# Patient Record
Sex: MALE | Race: WHITE | Employment: OTHER | URBAN - METROPOLITAN AREA
[De-identification: names, ages, dates, MRNs, and addresses within clinical notes are randomized per-mention and may not be internally consistent; named-entity substitution may affect disease eponyms.]

---

## 2020-01-10 ENCOUNTER — APPOINTMENT (OUTPATIENT)
Dept: CT IMAGING | Facility: HOSPITAL | Age: 75
End: 2020-01-10
Attending: EMERGENCY MEDICINE
Payer: MEDICARE

## 2020-01-10 ENCOUNTER — ANESTHESIA EVENT (OUTPATIENT)
Dept: SURGERY | Facility: HOSPITAL | Age: 75
End: 2020-01-10
Payer: MEDICARE

## 2020-01-10 ENCOUNTER — TELEPHONE (OUTPATIENT)
Dept: SURGERY | Facility: HOSPITAL | Age: 75
End: 2020-01-10

## 2020-01-10 ENCOUNTER — ANESTHESIA (OUTPATIENT)
Dept: SURGERY | Facility: HOSPITAL | Age: 75
End: 2020-01-10
Payer: MEDICARE

## 2020-01-10 ENCOUNTER — HOSPITAL ENCOUNTER (OUTPATIENT)
Facility: HOSPITAL | Age: 75
Setting detail: OBSERVATION
Discharge: HOME | End: 2020-01-12
Attending: EMERGENCY MEDICINE | Admitting: HOSPITALIST
Payer: MEDICARE

## 2020-01-10 ENCOUNTER — APPOINTMENT (OUTPATIENT)
Dept: GENERAL RADIOLOGY | Facility: HOSPITAL | Age: 75
End: 2020-01-10
Attending: UROLOGY
Payer: MEDICARE

## 2020-01-10 DIAGNOSIS — R33.9 URINARY RETENTION: ICD-10-CM

## 2020-01-10 DIAGNOSIS — N20.1 URETERAL CALCULI: ICD-10-CM

## 2020-01-10 DIAGNOSIS — E87.2 METABOLIC ACIDOSIS: ICD-10-CM

## 2020-01-10 DIAGNOSIS — N17.9 ACUTE RENAL FAILURE, UNSPECIFIED ACUTE RENAL FAILURE TYPE (HCC): Primary | ICD-10-CM

## 2020-01-10 DIAGNOSIS — N20.0 KIDNEY STONE: ICD-10-CM

## 2020-01-10 PROBLEM — E87.20 METABOLIC ACIDOSIS: Status: ACTIVE | Noted: 2020-01-10

## 2020-01-10 LAB
ALBUMIN SERPL-MCNC: 3.2 G/DL (ref 3.4–5)
ALBUMIN/GLOB SERPL: 0.7 {RATIO} (ref 1–2)
ALP LIVER SERPL-CCNC: 98 U/L (ref 45–117)
ALT SERPL-CCNC: 37 U/L (ref 16–61)
ANION GAP SERPL CALC-SCNC: 8 MMOL/L (ref 0–18)
ANION GAP SERPL CALC-SCNC: 8 MMOL/L (ref 0–18)
AST SERPL-CCNC: 19 U/L (ref 15–37)
BASOPHILS # BLD AUTO: 0.02 X10(3) UL (ref 0–0.2)
BASOPHILS NFR BLD AUTO: 0.2 %
BILIRUB SERPL-MCNC: 0.8 MG/DL (ref 0.1–2)
BILIRUB UR QL STRIP.AUTO: NEGATIVE
BUN BLD-MCNC: 63 MG/DL (ref 7–18)
BUN BLD-MCNC: 72 MG/DL (ref 7–18)
BUN/CREAT SERPL: 11 (ref 10–20)
BUN/CREAT SERPL: 13.1 (ref 10–20)
CALCIUM BLD-MCNC: 9.3 MG/DL (ref 8.5–10.1)
CALCIUM BLD-MCNC: 9.5 MG/DL (ref 8.5–10.1)
CHLORIDE SERPL-SCNC: 113 MMOL/L (ref 98–112)
CHLORIDE SERPL-SCNC: 120 MMOL/L (ref 98–112)
CLARITY UR REFRACT.AUTO: CLEAR
CO2 SERPL-SCNC: 19 MMOL/L (ref 21–32)
CO2 SERPL-SCNC: 19 MMOL/L (ref 21–32)
COLOR UR AUTO: YELLOW
CREAT BLD-MCNC: 4.81 MG/DL (ref 0.7–1.3)
CREAT BLD-MCNC: 6.57 MG/DL (ref 0.7–1.3)
DEPRECATED RDW RBC AUTO: 41.4 FL (ref 35.1–46.3)
EOSINOPHIL # BLD AUTO: 0.11 X10(3) UL (ref 0–0.7)
EOSINOPHIL NFR BLD AUTO: 0.9 %
ERYTHROCYTE [DISTWIDTH] IN BLOOD BY AUTOMATED COUNT: 12.3 % (ref 11–15)
GLOBULIN PLAS-MCNC: 4.7 G/DL (ref 2.8–4.4)
GLUCOSE BLD-MCNC: 102 MG/DL (ref 70–99)
GLUCOSE BLD-MCNC: 118 MG/DL (ref 70–99)
GLUCOSE UR STRIP.AUTO-MCNC: NEGATIVE MG/DL
HCT VFR BLD AUTO: 42.5 % (ref 39–53)
HGB BLD-MCNC: 13.7 G/DL (ref 13–17.5)
IMM GRANULOCYTES # BLD AUTO: 0.07 X10(3) UL (ref 0–1)
IMM GRANULOCYTES NFR BLD: 0.6 %
KETONES UR STRIP.AUTO-MCNC: NEGATIVE MG/DL
LYMPHOCYTES # BLD AUTO: 1.15 X10(3) UL (ref 1–4)
LYMPHOCYTES NFR BLD AUTO: 9.3 %
M PROTEIN MFR SERPL ELPH: 7.9 G/DL (ref 6.4–8.2)
MCH RBC QN AUTO: 29.7 PG (ref 26–34)
MCHC RBC AUTO-ENTMCNC: 32.2 G/DL (ref 31–37)
MCV RBC AUTO: 92 FL (ref 80–100)
MONOCYTES # BLD AUTO: 1.06 X10(3) UL (ref 0.1–1)
MONOCYTES NFR BLD AUTO: 8.6 %
NEUTROPHILS # BLD AUTO: 9.96 X10 (3) UL (ref 1.5–7.7)
NEUTROPHILS # BLD AUTO: 9.96 X10(3) UL (ref 1.5–7.7)
NEUTROPHILS NFR BLD AUTO: 80.4 %
NITRITE UR QL STRIP.AUTO: NEGATIVE
OSMOLALITY SERPL CALC.SUM OF ELEC: 312 MOSM/KG (ref 275–295)
OSMOLALITY SERPL CALC.SUM OF ELEC: 322 MOSM/KG (ref 275–295)
PH UR STRIP.AUTO: 5 [PH] (ref 4.5–8)
PLATELET # BLD AUTO: 333 10(3)UL (ref 150–450)
POTASSIUM SERPL-SCNC: 5 MMOL/L (ref 3.5–5.1)
POTASSIUM SERPL-SCNC: 5.4 MMOL/L (ref 3.5–5.1)
PROT UR STRIP.AUTO-MCNC: NEGATIVE MG/DL
RBC # BLD AUTO: 4.62 X10(6)UL (ref 3.8–5.8)
SODIUM SERPL-SCNC: 140 MMOL/L (ref 136–145)
SODIUM SERPL-SCNC: 147 MMOL/L (ref 136–145)
SP GR UR STRIP.AUTO: 1.01 (ref 1–1.03)
UROBILINOGEN UR STRIP.AUTO-MCNC: <2 MG/DL
WBC # BLD AUTO: 12.4 X10(3) UL (ref 4–11)

## 2020-01-10 PROCEDURE — 74176 CT ABD & PELVIS W/O CONTRAST: CPT | Performed by: EMERGENCY MEDICINE

## 2020-01-10 PROCEDURE — 99220 INITIAL OBSERVATION CARE,LEVL III: CPT | Performed by: HOSPITALIST

## 2020-01-10 PROCEDURE — 0T778DZ DILATION OF LEFT URETER WITH INTRALUMINAL DEVICE, VIA NATURAL OR ARTIFICIAL OPENING ENDOSCOPIC: ICD-10-PCS | Performed by: UROLOGY

## 2020-01-10 PROCEDURE — 0TC78ZZ EXTIRPATION OF MATTER FROM LEFT URETER, VIA NATURAL OR ARTIFICIAL OPENING ENDOSCOPIC: ICD-10-PCS | Performed by: UROLOGY

## 2020-01-10 PROCEDURE — 99202 OFFICE O/P NEW SF 15 MIN: CPT | Performed by: INTERNAL MEDICINE

## 2020-01-10 DEVICE — INLAY OPTIMA STENT 4.7F 26CM
Type: IMPLANTABLE DEVICE | Site: URETER | Status: NON-FUNCTIONAL
Removed: 2020-02-05

## 2020-01-10 RX ORDER — HYDROCODONE BITARTRATE AND ACETAMINOPHEN 5; 325 MG/1; MG/1
1 TABLET ORAL AS NEEDED
Status: DISCONTINUED | OUTPATIENT
Start: 2020-01-10 | End: 2020-01-10 | Stop reason: HOSPADM

## 2020-01-10 RX ORDER — LOSARTAN POTASSIUM 100 MG/1
100 TABLET ORAL DAILY
Status: ON HOLD | COMMUNITY
End: 2020-01-12

## 2020-01-10 RX ORDER — METOCLOPRAMIDE HYDROCHLORIDE 5 MG/ML
5 INJECTION INTRAMUSCULAR; INTRAVENOUS EVERY 8 HOURS PRN
Status: DISCONTINUED | OUTPATIENT
Start: 2020-01-10 | End: 2020-01-12

## 2020-01-10 RX ORDER — NALOXONE HYDROCHLORIDE 0.4 MG/ML
80 INJECTION, SOLUTION INTRAMUSCULAR; INTRAVENOUS; SUBCUTANEOUS AS NEEDED
Status: DISCONTINUED | OUTPATIENT
Start: 2020-01-10 | End: 2020-01-10 | Stop reason: HOSPADM

## 2020-01-10 RX ORDER — FINASTERIDE 5 MG/1
5 TABLET, FILM COATED ORAL DAILY
Status: DISCONTINUED | OUTPATIENT
Start: 2020-01-10 | End: 2020-01-12

## 2020-01-10 RX ORDER — DEXAMETHASONE SODIUM PHOSPHATE 4 MG/ML
VIAL (ML) INJECTION AS NEEDED
Status: DISCONTINUED | OUTPATIENT
Start: 2020-01-10 | End: 2020-01-10 | Stop reason: SURG

## 2020-01-10 RX ORDER — CIPROFLOXACIN 250 MG/1
250 TABLET, FILM COATED ORAL 2 TIMES DAILY
Status: ON HOLD | COMMUNITY
Start: 2020-01-05 | End: 2020-01-12

## 2020-01-10 RX ORDER — ROSUVASTATIN CALCIUM 20 MG/1
20 TABLET, COATED ORAL NIGHTLY
Status: ON HOLD | COMMUNITY
End: 2020-01-10

## 2020-01-10 RX ORDER — SODIUM CHLORIDE, SODIUM LACTATE, POTASSIUM CHLORIDE, CALCIUM CHLORIDE 600; 310; 30; 20 MG/100ML; MG/100ML; MG/100ML; MG/100ML
INJECTION, SOLUTION INTRAVENOUS CONTINUOUS
Status: DISCONTINUED | OUTPATIENT
Start: 2020-01-10 | End: 2020-01-10 | Stop reason: HOSPADM

## 2020-01-10 RX ORDER — LABETALOL HYDROCHLORIDE 5 MG/ML
10 INJECTION, SOLUTION INTRAVENOUS EVERY 4 HOURS PRN
Status: DISCONTINUED | OUTPATIENT
Start: 2020-01-10 | End: 2020-01-10

## 2020-01-10 RX ORDER — AMLODIPINE BESYLATE 5 MG/1
5 TABLET ORAL DAILY
Status: DISCONTINUED | OUTPATIENT
Start: 2020-01-10 | End: 2020-01-11

## 2020-01-10 RX ORDER — MEPERIDINE HYDROCHLORIDE 25 MG/ML
12.5 INJECTION INTRAMUSCULAR; INTRAVENOUS; SUBCUTANEOUS AS NEEDED
Status: DISCONTINUED | OUTPATIENT
Start: 2020-01-10 | End: 2020-01-10 | Stop reason: HOSPADM

## 2020-01-10 RX ORDER — METOPROLOL SUCCINATE 50 MG/1
50 TABLET, EXTENDED RELEASE ORAL DAILY
Status: ON HOLD | COMMUNITY
End: 2020-01-12

## 2020-01-10 RX ORDER — ONDANSETRON 2 MG/ML
4 INJECTION INTRAMUSCULAR; INTRAVENOUS AS NEEDED
Status: DISCONTINUED | OUTPATIENT
Start: 2020-01-10 | End: 2020-01-10 | Stop reason: HOSPADM

## 2020-01-10 RX ORDER — METOPROLOL SUCCINATE 50 MG/1
50 TABLET, EXTENDED RELEASE ORAL
Status: DISCONTINUED | OUTPATIENT
Start: 2020-01-10 | End: 2020-01-12

## 2020-01-10 RX ORDER — DIPHENHYDRAMINE HYDROCHLORIDE 50 MG/ML
12.5 INJECTION INTRAMUSCULAR; INTRAVENOUS AS NEEDED
Status: DISCONTINUED | OUTPATIENT
Start: 2020-01-10 | End: 2020-01-10 | Stop reason: HOSPADM

## 2020-01-10 RX ORDER — HYDROCHLOROTHIAZIDE 25 MG/1
25 TABLET ORAL DAILY
Status: ON HOLD | COMMUNITY
End: 2020-01-12

## 2020-01-10 RX ORDER — HYDROCODONE BITARTRATE AND ACETAMINOPHEN 5; 325 MG/1; MG/1
2 TABLET ORAL AS NEEDED
Status: DISCONTINUED | OUTPATIENT
Start: 2020-01-10 | End: 2020-01-10 | Stop reason: HOSPADM

## 2020-01-10 RX ORDER — METOCLOPRAMIDE HYDROCHLORIDE 5 MG/ML
10 INJECTION INTRAMUSCULAR; INTRAVENOUS AS NEEDED
Status: DISCONTINUED | OUTPATIENT
Start: 2020-01-10 | End: 2020-01-10 | Stop reason: HOSPADM

## 2020-01-10 RX ORDER — HYDROMORPHONE HYDROCHLORIDE 1 MG/ML
0.4 INJECTION, SOLUTION INTRAMUSCULAR; INTRAVENOUS; SUBCUTANEOUS EVERY 5 MIN PRN
Status: DISCONTINUED | OUTPATIENT
Start: 2020-01-10 | End: 2020-01-10 | Stop reason: HOSPADM

## 2020-01-10 RX ORDER — OMEGA-3-ACID ETHYL ESTERS 1 G/1
4 CAPSULE, LIQUID FILLED ORAL DAILY
Status: ON HOLD | COMMUNITY
End: 2020-01-12

## 2020-01-10 RX ORDER — ONDANSETRON 2 MG/ML
4 INJECTION INTRAMUSCULAR; INTRAVENOUS EVERY 6 HOURS PRN
Status: DISCONTINUED | OUTPATIENT
Start: 2020-01-10 | End: 2020-01-12

## 2020-01-10 RX ORDER — CEFAZOLIN SODIUM/WATER 2 G/20 ML
2 SYRINGE (ML) INTRAVENOUS
Status: COMPLETED | OUTPATIENT
Start: 2020-01-10 | End: 2020-01-10

## 2020-01-10 RX ORDER — LIDOCAINE HYDROCHLORIDE 10 MG/ML
INJECTION, SOLUTION EPIDURAL; INFILTRATION; INTRACAUDAL; PERINEURAL AS NEEDED
Status: DISCONTINUED | OUTPATIENT
Start: 2020-01-10 | End: 2020-01-10 | Stop reason: SURG

## 2020-01-10 RX ORDER — SODIUM CHLORIDE 9 MG/ML
INJECTION, SOLUTION INTRAVENOUS CONTINUOUS
Status: DISCONTINUED | OUTPATIENT
Start: 2020-01-10 | End: 2020-01-12

## 2020-01-10 RX ORDER — HYDRALAZINE HYDROCHLORIDE 20 MG/ML
10 INJECTION INTRAMUSCULAR; INTRAVENOUS EVERY 4 HOURS PRN
Status: DISCONTINUED | OUTPATIENT
Start: 2020-01-10 | End: 2020-01-12

## 2020-01-10 RX ORDER — DUTASTERIDE 0.5 MG/1
0.5 CAPSULE, LIQUID FILLED ORAL DAILY
Status: ON HOLD | COMMUNITY
End: 2020-01-12

## 2020-01-10 RX ORDER — ACETAMINOPHEN 325 MG/1
650 TABLET ORAL EVERY 6 HOURS PRN
Status: DISCONTINUED | OUTPATIENT
Start: 2020-01-10 | End: 2020-01-12

## 2020-01-10 RX ORDER — ONDANSETRON 2 MG/ML
INJECTION INTRAMUSCULAR; INTRAVENOUS AS NEEDED
Status: DISCONTINUED | OUTPATIENT
Start: 2020-01-10 | End: 2020-01-10 | Stop reason: SURG

## 2020-01-10 RX ORDER — MIDAZOLAM HYDROCHLORIDE 1 MG/ML
1 INJECTION INTRAMUSCULAR; INTRAVENOUS EVERY 5 MIN PRN
Status: DISCONTINUED | OUTPATIENT
Start: 2020-01-10 | End: 2020-01-10 | Stop reason: HOSPADM

## 2020-01-10 RX ADMIN — DEXAMETHASONE SODIUM PHOSPHATE 4 MG: 4 MG/ML VIAL (ML) INJECTION at 18:05:00

## 2020-01-10 RX ADMIN — ONDANSETRON 4 MG: 2 INJECTION INTRAMUSCULAR; INTRAVENOUS at 18:05:00

## 2020-01-10 RX ADMIN — CEFAZOLIN SODIUM/WATER 2 G: 2 G/20 ML SYRINGE (ML) INTRAVENOUS at 18:05:00

## 2020-01-10 RX ADMIN — SODIUM CHLORIDE: 9 INJECTION, SOLUTION INTRAVENOUS at 18:48:00

## 2020-01-10 RX ADMIN — LIDOCAINE HYDROCHLORIDE 50 MG: 10 INJECTION, SOLUTION EPIDURAL; INFILTRATION; INTRACAUDAL; PERINEURAL at 17:59:00

## 2020-01-10 NOTE — H&P
[facility]  History and Physical     Sandhya Childers Patient Status:  Observation    1945 MRN [id number]   [facility] 3NW-A Attending Jorge Richard MD   Hosp Day # 0 PCP No primary care provider on file.      Chief Complaint: encounter. Dutasteride 0.5 MG Oral Cap, Take 0.5 mg by mouth daily. , Disp: , Rfl:   Ciprofloxacin HCl 250 MG Oral Tab, Take 250 mg by mouth 2 (two) times daily. , Disp: , Rfl:   Omega-3-acid Ethyl Esters 1 g Oral Cap, Take 4 capsules by mouth daily. , Disp INR in the last 168 hours. No results for input(s): TROP, CK in the last 168 hours. Imaging: Imaging data reviewed in Epic. ASSESSMENT / PLAN:     1. Acute renal failure on CKD due to obstructive uropathy  1. Weston catheter placed  2.  IVF  3. F/

## 2020-01-10 NOTE — ANESTHESIA PREPROCEDURE EVALUATION
PRE-OP EVALUATION    Patient Name: Sandhya Childers    Pre-op Diagnosis: Ureteral calculi [N20.1]    Procedure(s):  CYSTOSCOPY, LEFT RETROGRADE, PYELOGRAM, LEFT URETEROSCOPY, LASER LITHOTRIPSY, INSERTION LEFT URETERAL STENT    Surgeon(s) and Role:     * Mason tobacco: Never Used    Alcohol use: Yes      Comment: occ      Drug use: No     Available pre-op labs reviewed.   Lab Results   Component Value Date    WBC 12.4 (H) 01/10/2020    RBC 4.62 01/10/2020    HGB 13.7 01/10/2020    HCT 42.5 01/10/2020    MCV 92.0

## 2020-01-10 NOTE — PLAN OF CARE
Patient admitted via chart. Oriented to room. Safety precautions initiated. Bed in low position. Call light in reach.

## 2020-01-10 NOTE — ED NOTES
Attempted to cath patient. Unable to pass #16 Fr Weston. Passed, with difficulty, with a 16 Fr Coude catheter. Small amount of blood initially then clear yellow urine returned. Clamped after approximately 900 ml of urine returned. Dr. Darylene Hampshire notified.   Cooper Madrid

## 2020-01-10 NOTE — CONSULTS
[facility]  Report of Consultation    Maria Elenanakia Monroe Patient Status:  Observation    1945 MRN [id number]   [facility] 3NW-A Attending Grace Hernandez MD   Hosp Day # 0 PCP No primary care provider on file.        Assessment / Plan: 24 hr tab 50 mg, 50 mg, Oral, Daily  •  0.9% NaCl infusion, , Intravenous, Continuous  •  acetaminophen (TYLENOL) tab 650 mg, 650 mg, Oral, Q6H PRN  •  ondansetron HCl (ZOFRAN) injection 4 mg, 4 mg, Intravenous, Q6H PRN  •  Metoclopramide HCl (REGLAN) inje 01/10/2020     01/10/2020    CO2 19.0 01/10/2020     01/10/2020    CA 9.5 01/10/2020    ALB 3.2 01/10/2020    ALKPHO 98 01/10/2020    BILT 0.8 01/10/2020    TP 7.9 01/10/2020    AST 19 01/10/2020    ALT 37 01/10/2020       Imaging:   All imagin

## 2020-01-10 NOTE — PROGRESS NOTES
[facility] Pharmacy Note:  Renal Dose Adjustment for Metoclopramide (REGLAN)    Mannie Saleem has been prescribed Metoclopramide (REGLAN) 10 mg every 8 hours as needed for n/v.    Estimated Creatinine Clearance: 10.7 mL/min (A) (based on SCr of 6.57 mg/dL (H)).

## 2020-01-10 NOTE — ED PROVIDER NOTES
Patient Seen in: [facility] Emergency Department      History   Patient presents with:  Eval-G    Stated Complaint: eval g    HPI    25-year-old male presents emergency department who states has some right flank pain and suprapubic pain.   Patient st crackles no wheezes no accessory muscle use  Abdomen: Suprapubic fullness noted. , no rebound no guarding  no hepatosplenomegaly bowel sounds are present , no pulsatile mass  Extremities: No clubbing cyanosis or edema 2+ distal pulses.   Neuro: Cranial nerve coudé Weston catheter was placed and patient had 1800 cc of urine. Was having some discomfort from the Weston but definitely needed as he obviously has some obstructive issues most likely prostatic hypertrophy.   Did replace some urine with some normal salin spent ordering, reviewing, and interpreting ancillary testing and imaging. The patient was frequently reevaluated, and I made frequent checks of  vital signs and monitor. Nursing notes were reviewed.      Critical care time was[45 minutes], and exclusive of

## 2020-01-10 NOTE — ED INITIAL ASSESSMENT (HPI)
Pt here today w/ c/o right flank pain - reports has had urinary symptoms over the past week but went to quick care and was told it wasn't a bacterial infection.      Patient reports symptoms started with urinary frequency and then has had urinary frequency

## 2020-01-10 NOTE — CONSULTS
[facility] Urology   Consultation Note    Cy Gutierrez Patient Status:  Emergency    1945 MRN [id number]   Location [address] Attending Solo Tatum MD   Hosp Day # 0 PCP No primary care provider on file.      Re prostate surgery    No lower extremity numbness. No saddle anesthesia. No constipation. No new medication.     Baseline urination prior to 3 weeks ago    History:  Past Medical History:   Diagnosis Date   • Essential hypertension    • Hyperlipidemia    • ALT 37 01/10/2020     Lab Results   Component Value Date/Time    PSA 2.140 07/12/2013 07:53 AM    PSA 1.99 04/10/2012 08:15 AM    PSA 2.23 03/21/2011 08:18 AM       No results found for: PERCENTPSA, PSAPER    No results found for: FREEPSA, PSAFR      Serum visible pulmonary or pleural disease. OTHER:  Negative.       =====  CONCLUSION:  Mild to moderate right and moderate to severe left hydroureteronephrosis. This is likely in part related to bladder wall thickening. This may represent cystitis.   A neop indicated and in those cases there may be need for future procedures to remove remaining stones.  Typically, the procedure causes some swelling in the ureter which can cause obstruction and pain, for that reason a ureteral stent is often left in place after

## 2020-01-10 NOTE — PLAN OF CARE
Pt admitted from ER via cart. Pt alert and oriented. Weston intact with yellow to light pink urine noted. Pt states pain is \"better\" since catheter placed and declines any pain medication at this time.   Pt instructed in NPO status for probable OR later

## 2020-01-11 LAB
ANION GAP SERPL CALC-SCNC: 7 MMOL/L (ref 0–18)
BUN BLD-MCNC: 47 MG/DL (ref 7–18)
BUN/CREAT SERPL: 14.8 (ref 10–20)
CALCIUM BLD-MCNC: 8.6 MG/DL (ref 8.5–10.1)
CHLORIDE SERPL-SCNC: 120 MMOL/L (ref 98–112)
CO2 SERPL-SCNC: 18 MMOL/L (ref 21–32)
CREAT BLD-MCNC: 3.17 MG/DL (ref 0.7–1.3)
DEPRECATED RDW RBC AUTO: 42.2 FL (ref 35.1–46.3)
ERYTHROCYTE [DISTWIDTH] IN BLOOD BY AUTOMATED COUNT: 12.4 % (ref 11–15)
GLUCOSE BLD-MCNC: 114 MG/DL (ref 70–99)
HCT VFR BLD AUTO: 40.7 % (ref 39–53)
HGB BLD-MCNC: 12.9 G/DL (ref 13–17.5)
MCH RBC QN AUTO: 29.3 PG (ref 26–34)
MCHC RBC AUTO-ENTMCNC: 31.7 G/DL (ref 31–37)
MCV RBC AUTO: 92.3 FL (ref 80–100)
OSMOLALITY SERPL CALC.SUM OF ELEC: 313 MOSM/KG (ref 275–295)
PLATELET # BLD AUTO: 315 10(3)UL (ref 150–450)
POTASSIUM SERPL-SCNC: 5 MMOL/L (ref 3.5–5.1)
RBC # BLD AUTO: 4.41 X10(6)UL (ref 3.8–5.8)
SODIUM SERPL-SCNC: 145 MMOL/L (ref 136–145)
WBC # BLD AUTO: 12.7 X10(3) UL (ref 4–11)

## 2020-01-11 PROCEDURE — 99225 SUBSEQUENT OBSERVATION CARE: CPT | Performed by: HOSPITALIST

## 2020-01-11 PROCEDURE — 99214 OFFICE O/P EST MOD 30 MIN: CPT | Performed by: INTERNAL MEDICINE

## 2020-01-11 NOTE — OPERATIVE REPORT
[address] Patient Status:  Observation    1945 MRN [id number]   Location [facility] Attending Oralia Pichardo MD   Hosp Day # 0 PCP No primary care provider on file.        D prostate was moderately enlarged with a 4 cm prostatic urethral length, 2 cm of lateral lobe coaptation and mild enlargement of the median lobe. The bladder was severely trabeculated. There were no bladder tumors. The ureters appeared to be wide open.

## 2020-01-11 NOTE — ANESTHESIA POSTPROCEDURE EVALUATION
20 Mar Chau Patient Status:  Observation   Age/Gender 75year old male MRN [id number]   Location [address] [facility] Attending Ana Laura Buckner MD   Hosp Day # 0 PCP No primary care provider on file.        Anesthesi

## 2020-01-11 NOTE — PROGRESS NOTES
[facility]    Progress Note    Won Murphy Patient Status:  Observation    1945 MRN [id number]   [facility] 3NW-A Attending Ely Saeed MD   Hosp Day # 0 PCP No primary care provider on file.         Subjective:   Perry Haney Sandra Petra 2d Rndr(no Iv,no Oral)(cpt=74176)    Result Date: 1/10/2020  CONCLUSION:  Mild to moderate right and moderate to severe left hydroureteronephrosis. This is likely in part related to bladder wall thickening. This may represent cystitis.   A ginna

## 2020-01-11 NOTE — TELEPHONE ENCOUNTER
Surgeon:LAUREN Rubi M.D  Location (if known):[facility]  Procedure: TURP  Anesthesia Gen  Time Frame: as sson as schedule allows.   Time required: 90 min  Diagnosis: BPH  Special Instructions/Equipment: Monoplar Set  Estimated Post Op/Follow Up Appt: Nurse visit fo

## 2020-01-11 NOTE — PROGRESS NOTES
[facility]  Progress Note     Lydia Dunbar Patient Status:  Observation    1945 MRN [id number]   [facility] 3NW-A Attending Luis Muller MD   Hosp Day # 0 PCP No primary care provider on file.      Chief Complaint: Urinary r hours. No results for input(s): TROP, CK in the last 168 hours. Imaging: Imaging data reviewed in Epic. Medications:   • finasteride  5 mg Oral Daily   • Metoprolol Succinate ER  50 mg Oral Daily Beta Blocker       ASSESSMENT / PLAN:     1.  A

## 2020-01-11 NOTE — PROGRESS NOTES
NURSING ADMISSION NOTE      Patient admitted via Cart from PACU  Oriented to room. Safety precautions initiated. Bed in low position. Call light in reach.

## 2020-01-11 NOTE — PLAN OF CARE
Pt alert and orientatedx4. Room air. Daily weight. SCDs. Regular diet but has only had water this shift. Weston in place, red/pink urine output. Denies pain. Denies N/V. IVF infusing. Denies chest pain and shortness of breath. POC discussed with patient.  Ca patient's stated pain goal  Description  INTERVENTIONS:  - Encourage pt to monitor pain and request assistance  - Assess pain using appropriate pain scale  - Administer analgesics based on type and severity of pain and evaluate response  - Implement non-ph

## 2020-01-11 NOTE — PROGRESS NOTES
[facility]  Nephrology Progress Note    Marisela Carreon Attending:  Kimberly Mares MD       Assessment and Plan:    1) MOIRA- subacute kidney injury due to obstructive uropathy- much improved with Weston placement; may not completely recover to baseline g  01/11/2020    CA 8.6 01/11/2020       Imaging: All imaging studies reviewed.     Meds:   finasteride (PROSCAR) tab 5 mg, 5 mg, Oral, Daily  Metoprolol Succinate ER (Toprol XL) 24 hr tab 50 mg, 50 mg, Oral, Daily Beta Blocker  0.9% NaCl infusion,

## 2020-01-11 NOTE — ANESTHESIA PROCEDURE NOTES
Airway  Date/Time: 1/10/2020 6:00 PM  Urgency: elective      General Information and Staff    Patient location during procedure: OR  Anesthesiologist: Kia Cordon MD  Performed: anesthesiologist     Indications and Patient Condition  Indication

## 2020-01-11 NOTE — PLAN OF CARE
Received care of patient at 0730. Patient is A&OX4. Room air. VSS. Denies pain. Weston draining to gravity, clear pink tinged urine. Weston care education reviewed with patient. New blood pressure medication reviewed with patient, verbalized understanding.  A

## 2020-01-12 VITALS
RESPIRATION RATE: 18 BRPM | HEIGHT: 72 IN | SYSTOLIC BLOOD PRESSURE: 122 MMHG | TEMPERATURE: 98 F | HEART RATE: 66 BPM | OXYGEN SATURATION: 99 % | BODY MASS INDEX: 27.9 KG/M2 | WEIGHT: 206 LBS | DIASTOLIC BLOOD PRESSURE: 76 MMHG

## 2020-01-12 LAB
ANION GAP SERPL CALC-SCNC: 3 MMOL/L (ref 0–18)
BUN BLD-MCNC: 41 MG/DL (ref 7–18)
BUN/CREAT SERPL: 18.2 (ref 10–20)
CALCIUM BLD-MCNC: 9.3 MG/DL (ref 8.5–10.1)
CHLORIDE SERPL-SCNC: 117 MMOL/L (ref 98–112)
CO2 SERPL-SCNC: 24 MMOL/L (ref 21–32)
CREAT BLD-MCNC: 2.25 MG/DL (ref 0.7–1.3)
GLUCOSE BLD-MCNC: 93 MG/DL (ref 70–99)
OSMOLALITY SERPL CALC.SUM OF ELEC: 308 MOSM/KG (ref 275–295)
POTASSIUM SERPL-SCNC: 5.4 MMOL/L (ref 3.5–5.1)
SODIUM SERPL-SCNC: 144 MMOL/L (ref 136–145)

## 2020-01-12 PROCEDURE — 99213 OFFICE O/P EST LOW 20 MIN: CPT | Performed by: INTERNAL MEDICINE

## 2020-01-12 PROCEDURE — 99217 OBSERVATION CARE DISCHARGE: CPT | Performed by: HOSPITALIST

## 2020-01-12 RX ORDER — METOPROLOL SUCCINATE 25 MG/1
25 TABLET, EXTENDED RELEASE ORAL
Qty: 30 TABLET | Refills: 0 | Status: SHIPPED | OUTPATIENT
Start: 2020-01-13 | End: 2020-02-11

## 2020-01-12 RX ORDER — METOPROLOL SUCCINATE 25 MG/1
25 TABLET, EXTENDED RELEASE ORAL
Status: DISCONTINUED | OUTPATIENT
Start: 2020-01-13 | End: 2020-01-12

## 2020-01-12 NOTE — PROGRESS NOTES
[facility]  Urology Progress Note    Nickola Flight Patient Status:  Observation    1945 MRN [id number]   [facility] 3NW-A Attending Shahriar Das MD   Hosp Day # 0 PCP No primary care provider on file.      Assessment/Plan:    TU

## 2020-01-12 NOTE — PROGRESS NOTES
1/12/2020 PT AND SPOUSE TAUGHT KENT CATHETER CARE AND LEG BAG TEACHING INCLUDING HOW TO EMPTY AND CHANGE BETWEEN THE TWO BAGS.  SIGNS AND SYMPTOMS OF INFECTION OF WHICH TO NOTIFY DOCTOR REVIEWED WITH PT. PT ENCOURAGED TO KEEP URINE DRAINAGE BAG BELOW LEVEL

## 2020-01-12 NOTE — PROGRESS NOTES
[facility]  Progress Note     Sandhya Font Patient Status:  Observation    1945 MRN [id number]   [facility] [place] 3NW-A Attending Jorge Richard MD   Hosp Day # 0 PCP No primary care provider on file.      Chief Complaint: Urinary r INR in the last 168 hours. No results for input(s): TROP, CK in the last 168 hours. Imaging: Imaging data reviewed in Epic.     Medications:   • [START ON 1/13/2020] Metoprolol Succinate ER  25 mg Oral Daily Beta Blocker   • psyllium  1 packet Or

## 2020-01-12 NOTE — PROGRESS NOTES
[facility]  Nephrology Progress Note    Camella Minus Attending:  Trang Valentine MD       Assessment and Plan:    1) MOIRA- subacute kidney injury due to obstructive uropathy- much improved with Weston placement / stent- near baseline.  Taking PO well, r Daily  Metoprolol Succinate ER (Toprol XL) 24 hr tab 50 mg, 50 mg, Oral, Daily Beta Blocker  0.9% NaCl infusion, , Intravenous, Continuous  acetaminophen (TYLENOL) tab 650 mg, 650 mg, Oral, Q6H PRN  ondansetron HCl (ZOFRAN) injection 4 mg, 4 mg, Intravenou

## 2020-01-12 NOTE — PLAN OF CARE
Problem: GENITOURINARY - ADULT  Goal: Absence of urinary retention  Description  INTERVENTIONS:  - Assess patient’s ability to void and empty bladder  - Monitor intake/output and perform bladder scan as needed  - Follow urinary retention protocol/standar response  - Consider cultural and social influences on pain and pain management  - Manage/alleviate anxiety  - Utilize distraction and/or relaxation techniques  - Monitor for opioid side effects  - Notify MD/LIP if interventions unsuccessful or patient rep

## 2020-01-12 NOTE — PLAN OF CARE
Pt resting comfortably in bed upon assessment, states he \"feels fine\" tonight. Weston draining clear light pink urine, adequate amounts. Pt denies any abdominal pain or discomfort. Pt tolerating diet, states he is passing flatus, no nausea.  SCDs on while devices as appropriate  - Consider OT/PT consult to assist with strengthening/mobility  - Encourage toileting schedule  Outcome: Progressing     Problem: PAIN - ADULT  Goal: Verbalizes/displays adequate comfort level or patient's stated pain goal  Descript

## 2020-01-13 LAB
CALCULI MASS: 84 MG
CALCULI NUMBER: 1

## 2020-01-13 NOTE — DISCHARGE SUMMARY
[facility]  DISCHARGE SUMMARY     Ratna Quiles Patient Status:  Observation    1945 MRN [id number]   [facility] 3NW-A Attending No att. providers found   Hosp Day # 0 PCP No primary care provider on file.      Date of Admission urology as an outpt for TURP and stent removal.       Lace+ Score: 38  59-90 High Risk  29-58 Medium Risk  0-28   Low Risk         TCM Follow-Up Recommendation:  LACE 29-58:  Moderate Risk of readmission after discharge from the hospital.    Procedures yakovi Length Department Provider     1/15/2020 11:45 AM Arrive by 11:30 AM NEW PT ACUTE VISIT [5244] 30 min.  [facility], [address] [facility], Flora Hollis MD    Patient Instructions:          Location Instructions:      [address] [facility]

## 2020-01-14 ENCOUNTER — APPOINTMENT (OUTPATIENT)
Dept: ULTRASOUND IMAGING | Facility: HOSPITAL | Age: 75
DRG: 554 | End: 2020-01-14
Attending: EMERGENCY MEDICINE
Payer: MEDICARE

## 2020-01-14 ENCOUNTER — HOSPITAL ENCOUNTER (INPATIENT)
Facility: HOSPITAL | Age: 75
LOS: 2 days | Discharge: HOME | DRG: 554 | End: 2020-01-16
Attending: EMERGENCY MEDICINE | Admitting: INTERNAL MEDICINE
Payer: MEDICARE

## 2020-01-14 DIAGNOSIS — L03.116 CELLULITIS OF LEFT KNEE: Primary | ICD-10-CM

## 2020-01-14 DIAGNOSIS — D72.829 LEUKOCYTOSIS, UNSPECIFIED TYPE: ICD-10-CM

## 2020-01-14 DIAGNOSIS — N39.0 URINARY TRACT INFECTION WITHOUT HEMATURIA, SITE UNSPECIFIED: ICD-10-CM

## 2020-01-14 PROBLEM — R73.9 HYPERGLYCEMIA: Status: ACTIVE | Noted: 2020-01-14

## 2020-01-14 LAB
ALBUMIN SERPL-MCNC: 2.9 G/DL (ref 3.4–5)
ALBUMIN/GLOB SERPL: 0.6 {RATIO} (ref 1–2)
ALP LIVER SERPL-CCNC: 92 U/L (ref 45–117)
ALT SERPL-CCNC: 33 U/L (ref 16–61)
ANION GAP SERPL CALC-SCNC: 8 MMOL/L (ref 0–18)
AST SERPL-CCNC: 21 U/L (ref 15–37)
BASOPHIL SYNOVIAL FLUID: 0 %
BASOPHILS # BLD AUTO: 0.03 X10(3) UL (ref 0–0.2)
BASOPHILS NFR BLD AUTO: 0.2 %
BILIRUB SERPL-MCNC: 1.5 MG/DL (ref 0.1–2)
BILIRUB UR QL STRIP.AUTO: NEGATIVE
BUN BLD-MCNC: 36 MG/DL (ref 7–18)
BUN/CREAT SERPL: 19.1 (ref 10–20)
CALCIUM BLD-MCNC: 9.4 MG/DL (ref 8.5–10.1)
CHLORIDE SERPL-SCNC: 109 MMOL/L (ref 98–112)
CO2 SERPL-SCNC: 20 MMOL/L (ref 21–32)
COLOR UR AUTO: YELLOW
CREAT BLD-MCNC: 1.88 MG/DL (ref 0.7–1.3)
CRYSTALS: POSITIVE
DEPRECATED RDW RBC AUTO: 40.5 FL (ref 35.1–46.3)
EOSINOPHIL # BLD AUTO: 0.21 X10(3) UL (ref 0–0.7)
EOSINOPHIL NFR BLD AUTO: 1.3 %
EOSINOPHIL SYNOVIAL FLUID: 0 %
ERYTHROCYTE [DISTWIDTH] IN BLOOD BY AUTOMATED COUNT: 12.3 % (ref 11–15)
GLOBULIN PLAS-MCNC: 5.1 G/DL (ref 2.8–4.4)
GLUCOSE BLD-MCNC: 120 MG/DL (ref 70–99)
GLUCOSE UR STRIP.AUTO-MCNC: NEGATIVE MG/DL
HCT VFR BLD AUTO: 42.3 % (ref 39–53)
HGB BLD-MCNC: 13.8 G/DL (ref 13–17.5)
IMM GRANULOCYTES # BLD AUTO: 0.17 X10(3) UL (ref 0–1)
IMM GRANULOCYTES NFR BLD: 1 %
KETONES UR STRIP.AUTO-MCNC: NEGATIVE MG/DL
LYMPH SYNOVIAL FLUID: 0 %
LYMPHOCYTES # BLD AUTO: 1.45 X10(3) UL (ref 1–4)
LYMPHOCYTES NFR BLD AUTO: 8.9 %
M PROTEIN MFR SERPL ELPH: 8 G/DL (ref 6.4–8.2)
MCH RBC QN AUTO: 29.7 PG (ref 26–34)
MCHC RBC AUTO-ENTMCNC: 32.6 G/DL (ref 31–37)
MCV RBC AUTO: 91 FL (ref 80–100)
MON/MAC/HIST SYNOVIAL FLUID: 12 %
MONOCYTES # BLD AUTO: 1.37 X10(3) UL (ref 0.1–1)
MONOCYTES NFR BLD AUTO: 8.4 %
NEUTROPHIL SYNOVIAL FLUID: 88 % (ref ?–20)
NEUTROPHILS # BLD AUTO: 13.06 X10 (3) UL (ref 1.5–7.7)
NEUTROPHILS # BLD AUTO: 13.06 X10(3) UL (ref 1.5–7.7)
NEUTROPHILS NFR BLD AUTO: 80.2 %
NITRITE UR QL STRIP.AUTO: NEGATIVE
OSMOLALITY SERPL CALC.SUM OF ELEC: 294 MOSM/KG (ref 275–295)
PH UR STRIP.AUTO: 5 [PH] (ref 4.5–8)
PLATELET # BLD AUTO: 329 10(3)UL (ref 150–450)
POTASSIUM SERPL-SCNC: 4.3 MMOL/L (ref 3.5–5.1)
PROT UR STRIP.AUTO-MCNC: 30 MG/DL
RBC # BLD AUTO: 4.65 X10(6)UL (ref 3.8–5.8)
RBC #/AREA URNS AUTO: >10 /HPF
SED RATE-ML: 45 MM/HR (ref 0–12)
SODIUM SERPL-SCNC: 137 MMOL/L (ref 136–145)
SP GR UR STRIP.AUTO: 1.02 (ref 1–1.03)
TOTAL CELLS COUNTED: 100
UROBILINOGEN UR STRIP.AUTO-MCNC: <2 MG/DL
WBC # BLD AUTO: 16.3 X10(3) UL (ref 4–11)

## 2020-01-14 PROCEDURE — 81001 URINALYSIS AUTO W/SCOPE: CPT | Performed by: EMERGENCY MEDICINE

## 2020-01-14 PROCEDURE — 89050 BODY FLUID CELL COUNT: CPT | Performed by: EMERGENCY MEDICINE

## 2020-01-14 PROCEDURE — 99285 EMERGENCY DEPT VISIT HI MDM: CPT

## 2020-01-14 PROCEDURE — 87086 URINE CULTURE/COLONY COUNT: CPT | Performed by: EMERGENCY MEDICINE

## 2020-01-14 PROCEDURE — 89051 BODY FLUID CELL COUNT: CPT | Performed by: EMERGENCY MEDICINE

## 2020-01-14 PROCEDURE — 96361 HYDRATE IV INFUSION ADD-ON: CPT

## 2020-01-14 PROCEDURE — 85025 COMPLETE CBC W/AUTO DIFF WBC: CPT | Performed by: EMERGENCY MEDICINE

## 2020-01-14 PROCEDURE — 93971 EXTREMITY STUDY: CPT | Performed by: EMERGENCY MEDICINE

## 2020-01-14 PROCEDURE — 96374 THER/PROPH/DIAG INJ IV PUSH: CPT

## 2020-01-14 PROCEDURE — 87070 CULTURE OTHR SPECIMN AEROBIC: CPT | Performed by: EMERGENCY MEDICINE

## 2020-01-14 PROCEDURE — 36415 COLL VENOUS BLD VENIPUNCTURE: CPT

## 2020-01-14 PROCEDURE — 80053 COMPREHEN METABOLIC PANEL: CPT | Performed by: EMERGENCY MEDICINE

## 2020-01-14 PROCEDURE — 85652 RBC SED RATE AUTOMATED: CPT | Performed by: EMERGENCY MEDICINE

## 2020-01-14 PROCEDURE — 20610 DRAIN/INJ JOINT/BURSA W/O US: CPT

## 2020-01-14 PROCEDURE — 89060 EXAM SYNOVIAL FLUID CRYSTALS: CPT | Performed by: EMERGENCY MEDICINE

## 2020-01-14 PROCEDURE — 87040 BLOOD CULTURE FOR BACTERIA: CPT | Performed by: EMERGENCY MEDICINE

## 2020-01-14 PROCEDURE — 87205 SMEAR GRAM STAIN: CPT | Performed by: EMERGENCY MEDICINE

## 2020-01-14 PROCEDURE — 0S9D3ZZ DRAINAGE OF LEFT KNEE JOINT, PERCUTANEOUS APPROACH: ICD-10-PCS | Performed by: EMERGENCY MEDICINE

## 2020-01-14 RX ORDER — MORPHINE SULFATE 4 MG/ML
4 INJECTION, SOLUTION INTRAMUSCULAR; INTRAVENOUS EVERY 30 MIN PRN
Status: ACTIVE | OUTPATIENT
Start: 2020-01-14 | End: 2020-01-14

## 2020-01-14 RX ORDER — METOPROLOL SUCCINATE 25 MG/1
25 TABLET, EXTENDED RELEASE ORAL
Status: DISCONTINUED | OUTPATIENT
Start: 2020-01-15 | End: 2020-01-16

## 2020-01-14 RX ORDER — ONDANSETRON 2 MG/ML
4 INJECTION INTRAMUSCULAR; INTRAVENOUS EVERY 4 HOURS PRN
Status: DISCONTINUED | OUTPATIENT
Start: 2020-01-14 | End: 2020-01-14

## 2020-01-14 RX ORDER — METOCLOPRAMIDE HYDROCHLORIDE 5 MG/ML
5 INJECTION INTRAMUSCULAR; INTRAVENOUS EVERY 8 HOURS PRN
Status: DISCONTINUED | OUTPATIENT
Start: 2020-01-14 | End: 2020-01-16

## 2020-01-14 RX ORDER — POLYETHYLENE GLYCOL 3350 17 G/17G
17 POWDER, FOR SOLUTION ORAL DAILY PRN
Status: DISCONTINUED | OUTPATIENT
Start: 2020-01-14 | End: 2020-01-16

## 2020-01-14 RX ORDER — SODIUM PHOSPHATE, DIBASIC AND SODIUM PHOSPHATE, MONOBASIC 7; 19 G/133ML; G/133ML
1 ENEMA RECTAL ONCE AS NEEDED
Status: DISCONTINUED | OUTPATIENT
Start: 2020-01-14 | End: 2020-01-16

## 2020-01-14 RX ORDER — CEFAZOLIN SODIUM/WATER 2 G/20 ML
2 SYRINGE (ML) INTRAVENOUS ONCE
Status: COMPLETED | OUTPATIENT
Start: 2020-01-14 | End: 2020-01-14

## 2020-01-14 RX ORDER — SODIUM CHLORIDE 9 MG/ML
INJECTION, SOLUTION INTRAVENOUS CONTINUOUS
Status: ACTIVE | OUTPATIENT
Start: 2020-01-14 | End: 2020-01-14

## 2020-01-14 RX ORDER — ACETAMINOPHEN 325 MG/1
650 TABLET ORAL EVERY 6 HOURS PRN
Status: DISCONTINUED | OUTPATIENT
Start: 2020-01-14 | End: 2020-01-16

## 2020-01-14 RX ORDER — ONDANSETRON 2 MG/ML
4 INJECTION INTRAMUSCULAR; INTRAVENOUS EVERY 6 HOURS PRN
Status: DISCONTINUED | OUTPATIENT
Start: 2020-01-14 | End: 2020-01-16

## 2020-01-14 RX ORDER — BISACODYL 10 MG
10 SUPPOSITORY, RECTAL RECTAL
Status: DISCONTINUED | OUTPATIENT
Start: 2020-01-14 | End: 2020-01-16

## 2020-01-14 RX ORDER — ENOXAPARIN SODIUM 100 MG/ML
40 INJECTION SUBCUTANEOUS NIGHTLY
Status: DISCONTINUED | OUTPATIENT
Start: 2020-01-14 | End: 2020-01-16

## 2020-01-14 NOTE — ED PROVIDER NOTES
Patient Seen in: [facility] Emergency Department      History   Patient presents with:  Abnormal Result    Stated Complaint: elev WBC    HPI    This is a 16-year-old male who presents with swelling to the left knee, left foot.   He says is primarily Smoker      Smokeless tobacco: Never Used    Alcohol use: Yes      Comment: occ    Drug use: No             Review of Systems    Positive for stated complaint: elev WBC  Other systems are as noted in HPI. Constitutional and vital signs reviewed.       All CO2 20.0 (*)     BUN 36 (*)     Creatinine 1.88 (*)     GFR, Non- 34 (*)     GFR, -American 40 (*)     Albumin 2.9 (*)     Globulin  5.1 (*)     A/G Ratio 0.6 (*)     All other components within normal limits   SED RATE, WESTERGREN ( In process                   Please view results for these tests on the individual orders.    CELL COUNT AND CRYSTALS SYNOVIAL   RAINBOW DRAW BLUE   RAINBOW DRAW LAVENDER   RAINBOW DRAW LIGHT GREEN   RAINBOW DRAW GOLD   BLOOD CULTURE   BLOOD CUL and augmentation. OTHER:  There is a left popliteal fossa Baker's cyst measuring 2.7 x 1.9 x 0.9 cm. CONCLUSION:  1. No evidence of left lower extremity DVT. 2. 2.7 cm Baker's cysts within the left popliteal fossa. Dictated by:  Chung Núñez DO on small very small amount of fluid. It was sent for analysis. It did show that there was 88 neutrophils. I could not get a significant amounts of this this this fluid was sent for culture. Blood cultures were obtained. Patient was given 2 g of Ancef.   [place] [employer]

## 2020-01-14 NOTE — ED INITIAL ASSESSMENT (HPI)
Swelling to right knee and foot today. Couldn't walk. Went to immediate care. Pt had xrays, bloodwork, sent to ED for eval for Elevated WBC.  Just discharged from hospital on Sunday for kidney stone removal.

## 2020-01-15 LAB
ANION GAP SERPL CALC-SCNC: 8 MMOL/L (ref 0–18)
BASOPHILS # BLD AUTO: 0.03 X10(3) UL (ref 0–0.2)
BASOPHILS NFR BLD AUTO: 0.2 %
BUN BLD-MCNC: 31 MG/DL (ref 7–18)
BUN/CREAT SERPL: 18.2 (ref 10–20)
CALCIUM BLD-MCNC: 8.8 MG/DL (ref 8.5–10.1)
CHLORIDE SERPL-SCNC: 109 MMOL/L (ref 98–112)
CO2 SERPL-SCNC: 23 MMOL/L (ref 21–32)
CREAT BLD-MCNC: 1.7 MG/DL (ref 0.7–1.3)
DEPRECATED RDW RBC AUTO: 41.7 FL (ref 35.1–46.3)
EOSINOPHIL # BLD AUTO: 0.28 X10(3) UL (ref 0–0.7)
EOSINOPHIL NFR BLD AUTO: 2.2 %
ERYTHROCYTE [DISTWIDTH] IN BLOOD BY AUTOMATED COUNT: 12.2 % (ref 11–15)
GLUCOSE BLD-MCNC: 102 MG/DL (ref 70–99)
HCT VFR BLD AUTO: 38.1 % (ref 39–53)
HGB BLD-MCNC: 12.3 G/DL (ref 13–17.5)
IMM GRANULOCYTES # BLD AUTO: 0.13 X10(3) UL (ref 0–1)
IMM GRANULOCYTES NFR BLD: 1 %
LYMPHOCYTES # BLD AUTO: 1.28 X10(3) UL (ref 1–4)
LYMPHOCYTES NFR BLD AUTO: 10.1 %
MCH RBC QN AUTO: 30.1 PG (ref 26–34)
MCHC RBC AUTO-ENTMCNC: 32.3 G/DL (ref 31–37)
MCV RBC AUTO: 93.2 FL (ref 80–100)
MONOCYTES # BLD AUTO: 1.1 X10(3) UL (ref 0.1–1)
MONOCYTES NFR BLD AUTO: 8.7 %
NEUTROPHILS # BLD AUTO: 9.81 X10 (3) UL (ref 1.5–7.7)
NEUTROPHILS # BLD AUTO: 9.81 X10(3) UL (ref 1.5–7.7)
NEUTROPHILS NFR BLD AUTO: 77.8 %
OSMOLALITY SERPL CALC.SUM OF ELEC: 297 MOSM/KG (ref 275–295)
PLATELET # BLD AUTO: 252 10(3)UL (ref 150–450)
POTASSIUM SERPL-SCNC: 3.9 MMOL/L (ref 3.5–5.1)
RBC # BLD AUTO: 4.09 X10(6)UL (ref 3.8–5.8)
SODIUM SERPL-SCNC: 140 MMOL/L (ref 136–145)
URATE SERPL-MCNC: 7 MG/DL (ref 3.5–7.2)
WBC # BLD AUTO: 12.6 X10(3) UL (ref 4–11)

## 2020-01-15 PROCEDURE — 85025 COMPLETE CBC W/AUTO DIFF WBC: CPT | Performed by: INTERNAL MEDICINE

## 2020-01-15 PROCEDURE — 97116 GAIT TRAINING THERAPY: CPT

## 2020-01-15 PROCEDURE — 84550 ASSAY OF BLOOD/URIC ACID: CPT | Performed by: INTERNAL MEDICINE

## 2020-01-15 PROCEDURE — 80048 BASIC METABOLIC PNL TOTAL CA: CPT | Performed by: INTERNAL MEDICINE

## 2020-01-15 PROCEDURE — 97161 PT EVAL LOW COMPLEX 20 MIN: CPT

## 2020-01-15 RX ORDER — COLCHICINE 0.6 MG/1
0.6 TABLET ORAL DAILY
Status: DISCONTINUED | OUTPATIENT
Start: 2020-01-16 | End: 2020-01-16

## 2020-01-15 RX ORDER — COLCHICINE 0.6 MG/1
1.2 TABLET ORAL ONCE
Status: COMPLETED | OUTPATIENT
Start: 2020-01-15 | End: 2020-01-15

## 2020-01-15 RX ORDER — PREDNISONE 20 MG/1
40 TABLET ORAL
Status: DISCONTINUED | OUTPATIENT
Start: 2020-01-15 | End: 2020-01-16

## 2020-01-15 NOTE — PHYSICAL THERAPY NOTE
PHYSICAL THERAPY EVALUATION - INPATIENT     Room Number: 715/190-B  Evaluation Date: 1/15/2020  Type of Evaluation: Initial  Physician Order: PT Eval and Treat    Presenting Problem: L LE pain  Reason for Therapy: Mobility Dysfunction and Discharge P ASSESSMENT  Upper extremity ROM and strength are within functional limits     Lower extremity ROM is within functional limits     Lower extremity strength is within functional limits except for the following:    Left Hip flexion  3+/5  Left Knee extension stance time on L LE. Pt returned to room supine in bed with supervision. Pt educated on HEP and activity recommendations. Pt left with call light in reach. RN aware.      Exercise/Education Provided:  Functional activity tolerated  Gait training  Posture  S Established Goals: 3      CURRENT GOALS    Goal #1 Patient is able to demonstrate supine - sit EOB @ level: modified independent- MET     Goal #2 Patient is able to demonstrate transfers EOB to/from [facility] at assistance level: modified independent     Goal #3

## 2020-01-15 NOTE — PLAN OF CARE
Problem: SKIN/TISSUE INTEGRITY - ADULT  Goal: Skin integrity remains intact  Description  INTERVENTIONS  - Assess and document risk factors for pressure ulcer development  - Assess and document skin integrity  - Monitor for areas of redness and/or skin b as appropriate  Outcome: Progressing     Problem: SAFETY ADULT - FALL  Goal: Free from fall injury  Description  INTERVENTIONS:  - Assess pt frequently for physical needs  - Identify cognitive and physical deficits and behaviors that affect risk of falls. management    Interventions:   - PRN pain medication  -Cluster care  -Dim lighting  -Hot/cold packs  - See additional Care Plan goals for specific interventions   Outcome: Progressing    See flowsheets. Axo x 4. Reading glasses at home. RA. IS. No tele.  Ch

## 2020-01-15 NOTE — H&P
[facility] Hospital[facility] History and Physical      Patient presents with:  Abnormal Result       PCP: Alisha Eddy MD      History of Present Illness: Patient is a 75year old male with PMH sig for morbid obesity, HTN, nephrolithiasis presents for eval of swelling normal. Teeth and gums normal.   Neck: Supple, symmetrical, trachea midline, no cervical or supraclavicular lymph adenopathy, thyroid: no enlargment/tenderness/nodules appreciated   Lungs:   Clear to auscultation bilaterally.  Normal effort   Chest wall:  N 1/14/2020  PROCEDURE:  US VENOUS DOPPLER LEG LEFT - DIAG IMG (CPT=93971)  COMPARISON:  None. INDICATIONS:  Left lower extremity pain. TECHNIQUE:  Real time, grey scale, and duplex ultrasound was used to evaluate the lower extremity venous system.  B-mode limited without intravenous or oral contrast.  KIDNEYS:  Mild right hydroureteronephrosis and moderate to severe left hydroureteronephrosis present. On the left this is likely in part related to a stone of the distal left ureter measuring 7 x 5 mm.   The b 11:09          Xr Or - N/c    Result Date: 1/10/2020  PROCEDURE:  XR OR - N/C  COMPARISON:  None. INDICATIONS:  Bilateral retrograde with left-sided stent placement.   TECHNIQUE:   FLUOROSCOPY IMAGES OBTAINED:  5 FLUOROSCOPY TIME:  57 seconds TECHNOLOGIST

## 2020-01-15 NOTE — DIETARY NOTE
[address] [place] [address]     Admitting diagnosis:  Cellulitis of left knee [Y67.386]  Urinary tract infection without hematuria, site unspecified [N39.0]  Leukocytosis, unspecified type [D72.829]    Ht: 182.9 cm (6' 0.01\")  Wt

## 2020-01-15 NOTE — PLAN OF CARE
Pt is aox4, VSS, afebrile. RA. Tele NSR. Lovenox, electrolyte protocol. Up with walker. C/o pain in knee moving down to ankle with movement. Indwelling isabel catheter in place from cystoscopy this past weekend.  Pt to f/u with urology in a week for another Educate pt/family on patient safety including physical limitations  - Instruct pt to call for assistance with activity based on assessment  - Modify environment to reduce risk of injury  - Provide assistive devices as appropriate  - Consider OT/PT consult

## 2020-01-15 NOTE — PROGRESS NOTES
[facility] Pharmacy Note:  Renal Dose Adjustment for Metoclopramide (REGLAN)    Shane Hemphill has been prescribed Metoclopramide (REGLAN) 10 mg every 8 hours as needed for nausea and vomiting. Estimated Creatinine Clearance: 37.3 mL/min (A) (based on SCr of 1.

## 2020-01-15 NOTE — PROGRESS NOTES
[facility] Pharmacy Note:  Renal Adjustment for cefazolin (ANCEF)    Rowan Ferrer is a 75year old male who has been prescribed cefazolin (ANCEF) 2 g every 8 hrs. CrCl is estimated creatinine clearance is 37.3 mL/min (A) (based on SCr of 1.88 mg/dL (H)).  so the

## 2020-01-15 NOTE — PROGRESS NOTES
NURSING ADMISSION NOTE      Patient admitted via Cart  Oriented to room. Safety precautions initiated. Bed in low position. Call light in reach. Admission navigator completed w/ pt. Wife at bedside. C.o mild pain to LLE. Report given to KIMBERLY Day.

## 2020-01-16 VITALS
DIASTOLIC BLOOD PRESSURE: 70 MMHG | HEART RATE: 67 BPM | OXYGEN SATURATION: 98 % | SYSTOLIC BLOOD PRESSURE: 136 MMHG | HEIGHT: 72.01 IN | RESPIRATION RATE: 24 BRPM | WEIGHT: 208.31 LBS | BODY MASS INDEX: 28.22 KG/M2 | TEMPERATURE: 98 F

## 2020-01-16 RX ORDER — PREDNISONE 20 MG/1
40 TABLET ORAL
Qty: 10 TABLET | Refills: 0 | Status: SHIPPED | OUTPATIENT
Start: 2020-01-17 | End: 2020-01-22

## 2020-01-16 RX ORDER — COLCHICINE 0.6 MG/1
0.6 TABLET ORAL DAILY
Qty: 7 TABLET | Refills: 0 | Status: SHIPPED | OUTPATIENT
Start: 2020-01-17 | End: 2020-01-24 | Stop reason: ALTCHOICE

## 2020-01-16 NOTE — PROGRESS NOTES
NURSING DISCHARGE NOTE    Discharged Home via Wheelchair. Accompanied by Support staff  Belongings Taken by patient/family.     Discharge paperwork, prescriptions, and follow up appts with PCP and urology discussed, pt and wife verbalized understanding

## 2020-01-16 NOTE — PLAN OF CARE
Problem: SKIN/TISSUE INTEGRITY - ADULT  Goal: Skin integrity remains intact  Description  INTERVENTIONS  - Assess and document risk factors for pressure ulcer development  - Assess and document skin integrity  - Monitor for areas of redness and/or skin b unsuccessful or patient reports new pain  - Anticipate increased pain with activity and pre-medicate as appropriate  Outcome: Progressing     Problem: DISCHARGE PLANNING  Goal: Discharge to home or other facility with appropriate resources  Description  IN pain to left knee and toe- reports \"much better\" than earlier in day, pt stated \"I couldn't even touch my knee,\" (while pt rubbing own knee) redness/swelling improved, encourage elevate LLE with pillow support.  Pt declining ice packs/pain meds at this

## 2020-01-16 NOTE — CM/SW NOTE
01/16/20 1154   CM/SW Referral Data   Referral Source   (PT=)   Reason for Referral Discharge planning   Informant Patient;Spouse   Patient Info   Patient's Mental Status Alert;Oriented   Patient's [address]   Patient lives wi

## 2020-01-16 NOTE — PROGRESS NOTES
[facility] Pharmacy Note:  Renal Adjustment for cefazolin (ANCEF)    Yassine Hawthorne is a 75year old male who has been prescribed cefazolin (ANCEF) 1 gm every 12 hrs. CrCl is estimated creatinine clearance is 41.2 mL/min (A) (based on SCr of 1.7 mg/dL (H)).  so th

## 2020-01-17 NOTE — DISCHARGE SUMMARY
[facility] Discharge Summary    Patient ID:  Cy Gutierrez  [id number]  82 year old  7/26/1945    Admit date: 1/14/2020  Discharge date and time: 1/16/20  Attending Physician: Debbie Tijerina MD  Primary Care Physician: Brad Joshi MD     Admit but urine culture was neg. Therefore he was not continued on abx on d/c. He will need to f/u with Dr. lAec Couch for upcoming planned TURP.      Day of discharge Exam   01/16/20  1254   BP: 136/70   Pulse: 67   Resp: 24   Temp: 97.6 °F (36.4 °C)     Exam on day o effusion. Atherosclerotic calcification is present in the femoral, popliteal, and posterior tibial arteries. IMPRESSION: 1. No acute fracture or malalignment. 2. Small suprapatellar joint effusion.     Us Venous Doppler Leg Left - Diag Img (cpt=93971) ([address]) which includes the Dose Index Registry. PATIENT STATED HISTORY: (As transcribed by Technologist)  Patient presents with urinary frequency with dysuria and right flank pain. Unable to urinate.     FINDINGS:  Please note th represent cystitis. A neoplastic process is difficult to exclude. At the distal left ureter there is also a stone measuring up to 7 x 5 mm. Moderate prostatomegaly. Cholelithiasis.    Dictated by: Sam Masterson MD on 1/10/2020 at 11:04     Approved by: Franky Carvalho

## 2020-01-27 PROBLEM — E87.2 METABOLIC ACIDOSIS: Status: RESOLVED | Noted: 2020-01-10 | Resolved: 2020-01-27

## 2020-01-27 PROBLEM — N17.9 ACUTE RENAL FAILURE, UNSPECIFIED ACUTE RENAL FAILURE TYPE (HCC): Status: RESOLVED | Noted: 2020-01-10 | Resolved: 2020-01-27

## 2020-01-27 PROBLEM — E66.3 OVERWEIGHT (BMI 25.0-29.9): Status: ACTIVE | Noted: 2020-01-27

## 2020-01-27 PROBLEM — E87.20 METABOLIC ACIDOSIS: Status: RESOLVED | Noted: 2020-01-10 | Resolved: 2020-01-27

## 2020-01-27 PROBLEM — N17.9 ACUTE RENAL FAILURE: Status: RESOLVED | Noted: 2020-01-10 | Resolved: 2020-01-27

## 2020-01-27 PROBLEM — L03.116 CELLULITIS OF LEFT KNEE: Status: RESOLVED | Noted: 2020-01-14 | Resolved: 2020-01-27

## 2020-01-27 PROBLEM — N17.9 ACUTE RENAL FAILURE (HCC): Status: RESOLVED | Noted: 2020-01-10 | Resolved: 2020-01-27

## 2020-01-27 PROBLEM — N17.9 ACUTE RENAL FAILURE, UNSPECIFIED ACUTE RENAL FAILURE TYPE: Status: RESOLVED | Noted: 2020-01-10 | Resolved: 2020-01-27

## 2020-02-05 ENCOUNTER — ANESTHESIA EVENT (OUTPATIENT)
Dept: SURGERY | Facility: HOSPITAL | Age: 75
End: 2020-02-05
Payer: MEDICARE

## 2020-02-05 ENCOUNTER — HOSPITAL ENCOUNTER (OUTPATIENT)
Facility: HOSPITAL | Age: 75
Discharge: HOME | End: 2020-02-07
Attending: UROLOGY | Admitting: UROLOGY
Payer: MEDICARE

## 2020-02-05 ENCOUNTER — ANESTHESIA (OUTPATIENT)
Dept: SURGERY | Facility: HOSPITAL | Age: 75
End: 2020-02-05
Payer: MEDICARE

## 2020-02-05 DIAGNOSIS — N40.1 BENIGN PROSTATIC HYPERPLASIA WITH LOWER URINARY TRACT SYMPTOMS, SYMPTOM DETAILS UNSPECIFIED: ICD-10-CM

## 2020-02-05 PROCEDURE — 88305 TISSUE EXAM BY PATHOLOGIST: CPT | Performed by: UROLOGY

## 2020-02-05 PROCEDURE — 0TP98DZ REMOVAL OF INTRALUMINAL DEVICE FROM URETER, VIA NATURAL OR ARTIFICIAL OPENING ENDOSCOPIC: ICD-10-PCS | Performed by: UROLOGY

## 2020-02-05 PROCEDURE — 0VT08ZZ RESECTION OF PROSTATE, VIA NATURAL OR ARTIFICIAL OPENING ENDOSCOPIC: ICD-10-PCS | Performed by: UROLOGY

## 2020-02-05 PROCEDURE — 88344 IMHCHEM/IMCYTCHM EA MLT ANTB: CPT | Performed by: UROLOGY

## 2020-02-05 RX ORDER — HYDROCODONE BITARTRATE AND ACETAMINOPHEN 5; 325 MG/1; MG/1
2 TABLET ORAL EVERY 4 HOURS PRN
Status: DISCONTINUED | OUTPATIENT
Start: 2020-02-05 | End: 2020-02-07

## 2020-02-05 RX ORDER — METOPROLOL SUCCINATE 25 MG/1
25 TABLET, EXTENDED RELEASE ORAL
Status: DISCONTINUED | OUTPATIENT
Start: 2020-02-06 | End: 2020-02-07

## 2020-02-05 RX ORDER — DEXAMETHASONE SODIUM PHOSPHATE 4 MG/ML
VIAL (ML) INJECTION AS NEEDED
Status: DISCONTINUED | OUTPATIENT
Start: 2020-02-05 | End: 2020-02-05 | Stop reason: SURG

## 2020-02-05 RX ORDER — SODIUM CHLORIDE 0.9 % (FLUSH) 0.9 %
10 SYRINGE (ML) INJECTION AS NEEDED
Status: DISCONTINUED | OUTPATIENT
Start: 2020-02-05 | End: 2020-02-07

## 2020-02-05 RX ORDER — CEFAZOLIN SODIUM/WATER 2 G/20 ML
2 SYRINGE (ML) INTRAVENOUS EVERY 8 HOURS
Status: COMPLETED | OUTPATIENT
Start: 2020-02-05 | End: 2020-02-06

## 2020-02-05 RX ORDER — POLYETHYLENE GLYCOL 3350 17 G/17G
17 POWDER, FOR SOLUTION ORAL DAILY PRN
Status: DISCONTINUED | OUTPATIENT
Start: 2020-02-05 | End: 2020-02-07

## 2020-02-05 RX ORDER — NALOXONE HYDROCHLORIDE 0.4 MG/ML
80 INJECTION, SOLUTION INTRAMUSCULAR; INTRAVENOUS; SUBCUTANEOUS AS NEEDED
Status: DISCONTINUED | OUTPATIENT
Start: 2020-02-05 | End: 2020-02-05 | Stop reason: HOSPADM

## 2020-02-05 RX ORDER — SODIUM CHLORIDE, SODIUM LACTATE, POTASSIUM CHLORIDE, CALCIUM CHLORIDE 600; 310; 30; 20 MG/100ML; MG/100ML; MG/100ML; MG/100ML
INJECTION, SOLUTION INTRAVENOUS CONTINUOUS
Status: DISCONTINUED | OUTPATIENT
Start: 2020-02-05 | End: 2020-02-07

## 2020-02-05 RX ORDER — HYDROCODONE BITARTRATE AND ACETAMINOPHEN 5; 325 MG/1; MG/1
1 TABLET ORAL AS NEEDED
Status: DISCONTINUED | OUTPATIENT
Start: 2020-02-05 | End: 2020-02-05 | Stop reason: HOSPADM

## 2020-02-05 RX ORDER — MIDAZOLAM HYDROCHLORIDE 1 MG/ML
INJECTION INTRAMUSCULAR; INTRAVENOUS EVERY 5 MIN PRN
Status: DISCONTINUED | OUTPATIENT
Start: 2020-02-05 | End: 2020-02-05 | Stop reason: HOSPADM

## 2020-02-05 RX ORDER — IBUPROFEN 600 MG/1
600 TABLET ORAL ONCE AS NEEDED
Status: DISCONTINUED | OUTPATIENT
Start: 2020-02-05 | End: 2020-02-05 | Stop reason: HOSPADM

## 2020-02-05 RX ORDER — ONDANSETRON 2 MG/ML
4 INJECTION INTRAMUSCULAR; INTRAVENOUS AS NEEDED
Status: DISCONTINUED | OUTPATIENT
Start: 2020-02-05 | End: 2020-02-05 | Stop reason: HOSPADM

## 2020-02-05 RX ORDER — BISACODYL 10 MG
10 SUPPOSITORY, RECTAL RECTAL
Status: DISCONTINUED | OUTPATIENT
Start: 2020-02-05 | End: 2020-02-07

## 2020-02-05 RX ORDER — EPHEDRINE SULFATE 50 MG/ML
INJECTION, SOLUTION INTRAVENOUS AS NEEDED
Status: DISCONTINUED | OUTPATIENT
Start: 2020-02-05 | End: 2020-02-05 | Stop reason: SURG

## 2020-02-05 RX ORDER — MIDAZOLAM HYDROCHLORIDE 1 MG/ML
INJECTION INTRAMUSCULAR; INTRAVENOUS
Status: COMPLETED
Start: 2020-02-05 | End: 2020-02-05

## 2020-02-05 RX ORDER — HYDROMORPHONE HYDROCHLORIDE 1 MG/ML
INJECTION, SOLUTION INTRAMUSCULAR; INTRAVENOUS; SUBCUTANEOUS
Status: COMPLETED
Start: 2020-02-05 | End: 2020-02-05

## 2020-02-05 RX ORDER — SODIUM CHLORIDE, SODIUM LACTATE, POTASSIUM CHLORIDE, CALCIUM CHLORIDE 600; 310; 30; 20 MG/100ML; MG/100ML; MG/100ML; MG/100ML
INJECTION, SOLUTION INTRAVENOUS CONTINUOUS
Status: DISCONTINUED | OUTPATIENT
Start: 2020-02-05 | End: 2020-02-05 | Stop reason: HOSPADM

## 2020-02-05 RX ORDER — DEXAMETHASONE SODIUM PHOSPHATE 4 MG/ML
4 VIAL (ML) INJECTION AS NEEDED
Status: DISCONTINUED | OUTPATIENT
Start: 2020-02-05 | End: 2020-02-05 | Stop reason: HOSPADM

## 2020-02-05 RX ORDER — ONDANSETRON 2 MG/ML
INJECTION INTRAMUSCULAR; INTRAVENOUS AS NEEDED
Status: DISCONTINUED | OUTPATIENT
Start: 2020-02-05 | End: 2020-02-05 | Stop reason: SURG

## 2020-02-05 RX ORDER — OXYBUTYNIN CHLORIDE 5 MG/1
5 TABLET ORAL EVERY 6 HOURS PRN
Status: DISCONTINUED | OUTPATIENT
Start: 2020-02-05 | End: 2020-02-07

## 2020-02-05 RX ORDER — ACETAMINOPHEN 500 MG
1000 TABLET ORAL ONCE
Status: DISCONTINUED | OUTPATIENT
Start: 2020-02-05 | End: 2020-02-07

## 2020-02-05 RX ORDER — SODIUM PHOSPHATE, DIBASIC AND SODIUM PHOSPHATE, MONOBASIC 7; 19 G/133ML; G/133ML
1 ENEMA RECTAL ONCE AS NEEDED
Status: DISCONTINUED | OUTPATIENT
Start: 2020-02-05 | End: 2020-02-07

## 2020-02-05 RX ORDER — DOCUSATE SODIUM 100 MG/1
100 CAPSULE, LIQUID FILLED ORAL 2 TIMES DAILY
Status: DISCONTINUED | OUTPATIENT
Start: 2020-02-05 | End: 2020-02-07

## 2020-02-05 RX ORDER — ACETAMINOPHEN 325 MG/1
650 TABLET ORAL EVERY 4 HOURS PRN
Status: DISCONTINUED | OUTPATIENT
Start: 2020-02-05 | End: 2020-02-07

## 2020-02-05 RX ORDER — MORPHINE SULFATE 4 MG/ML
2 INJECTION, SOLUTION INTRAMUSCULAR; INTRAVENOUS EVERY 2 HOUR PRN
Status: DISCONTINUED | OUTPATIENT
Start: 2020-02-05 | End: 2020-02-07

## 2020-02-05 RX ORDER — ACETAMINOPHEN 500 MG
1000 TABLET ORAL ONCE
Status: DISCONTINUED | OUTPATIENT
Start: 2020-02-05 | End: 2020-02-05 | Stop reason: HOSPADM

## 2020-02-05 RX ORDER — CEFAZOLIN SODIUM/WATER 2 G/20 ML
2 SYRINGE (ML) INTRAVENOUS EVERY 8 HOURS
Status: DISCONTINUED | OUTPATIENT
Start: 2020-02-05 | End: 2020-02-05

## 2020-02-05 RX ORDER — HYDROCODONE BITARTRATE AND ACETAMINOPHEN 5; 325 MG/1; MG/1
2 TABLET ORAL AS NEEDED
Status: DISCONTINUED | OUTPATIENT
Start: 2020-02-05 | End: 2020-02-05 | Stop reason: HOSPADM

## 2020-02-05 RX ORDER — CEFAZOLIN SODIUM/WATER 2 G/20 ML
2 SYRINGE (ML) INTRAVENOUS ONCE
Status: COMPLETED | OUTPATIENT
Start: 2020-02-05 | End: 2020-02-05

## 2020-02-05 RX ORDER — HYDROCODONE BITARTRATE AND ACETAMINOPHEN 5; 325 MG/1; MG/1
1 TABLET ORAL EVERY 4 HOURS PRN
Status: DISCONTINUED | OUTPATIENT
Start: 2020-02-05 | End: 2020-02-07

## 2020-02-05 RX ORDER — LABETALOL HYDROCHLORIDE 5 MG/ML
5 INJECTION, SOLUTION INTRAVENOUS EVERY 5 MIN PRN
Status: DISCONTINUED | OUTPATIENT
Start: 2020-02-05 | End: 2020-02-05 | Stop reason: HOSPADM

## 2020-02-05 RX ORDER — MORPHINE SULFATE 4 MG/ML
1 INJECTION, SOLUTION INTRAMUSCULAR; INTRAVENOUS EVERY 2 HOUR PRN
Status: DISCONTINUED | OUTPATIENT
Start: 2020-02-05 | End: 2020-02-07

## 2020-02-05 RX ORDER — HYDROMORPHONE HYDROCHLORIDE 1 MG/ML
0.4 INJECTION, SOLUTION INTRAMUSCULAR; INTRAVENOUS; SUBCUTANEOUS EVERY 5 MIN PRN
Status: DISCONTINUED | OUTPATIENT
Start: 2020-02-05 | End: 2020-02-05 | Stop reason: HOSPADM

## 2020-02-05 RX ORDER — METOCLOPRAMIDE HYDROCHLORIDE 5 MG/ML
10 INJECTION INTRAMUSCULAR; INTRAVENOUS AS NEEDED
Status: DISCONTINUED | OUTPATIENT
Start: 2020-02-05 | End: 2020-02-05 | Stop reason: HOSPADM

## 2020-02-05 RX ORDER — ATROPA BELLADONNA AND OPIUM 16.2; 6 MG/1; MG/1
1 SUPPOSITORY RECTAL EVERY 6 HOURS PRN
Status: DISCONTINUED | OUTPATIENT
Start: 2020-02-05 | End: 2020-02-07

## 2020-02-05 RX ORDER — LIDOCAINE HYDROCHLORIDE 10 MG/ML
INJECTION, SOLUTION EPIDURAL; INFILTRATION; INTRACAUDAL; PERINEURAL AS NEEDED
Status: DISCONTINUED | OUTPATIENT
Start: 2020-02-05 | End: 2020-02-05 | Stop reason: SURG

## 2020-02-05 RX ORDER — ACETAMINOPHEN 500 MG
1000 TABLET ORAL ONCE
Status: ON HOLD | COMMUNITY
End: 2020-02-06

## 2020-02-05 RX ORDER — MORPHINE SULFATE 4 MG/ML
4 INJECTION, SOLUTION INTRAMUSCULAR; INTRAVENOUS EVERY 2 HOUR PRN
Status: DISCONTINUED | OUTPATIENT
Start: 2020-02-05 | End: 2020-02-07

## 2020-02-05 RX ADMIN — ONDANSETRON 4 MG: 2 INJECTION INTRAMUSCULAR; INTRAVENOUS at 10:39:00

## 2020-02-05 RX ADMIN — SODIUM CHLORIDE, SODIUM LACTATE, POTASSIUM CHLORIDE, CALCIUM CHLORIDE: 600; 310; 30; 20 INJECTION, SOLUTION INTRAVENOUS at 11:54:00

## 2020-02-05 RX ADMIN — LIDOCAINE HYDROCHLORIDE 50 MG: 10 INJECTION, SOLUTION EPIDURAL; INFILTRATION; INTRACAUDAL; PERINEURAL at 10:35:00

## 2020-02-05 RX ADMIN — EPHEDRINE SULFATE 5 MG: 50 INJECTION, SOLUTION INTRAVENOUS at 10:59:00

## 2020-02-05 RX ADMIN — CEFAZOLIN SODIUM/WATER 2 G: 2 G/20 ML SYRINGE (ML) INTRAVENOUS at 10:38:00

## 2020-02-05 RX ADMIN — DEXAMETHASONE SODIUM PHOSPHATE 4 MG: 4 MG/ML VIAL (ML) INJECTION at 10:39:00

## 2020-02-05 RX ADMIN — EPHEDRINE SULFATE 10 MG: 50 INJECTION, SOLUTION INTRAVENOUS at 10:43:00

## 2020-02-05 NOTE — H&P
[facility]  H&P    Theola Krabbe Patient Status:  Hospital Outpatient Surgery    1945 MRN [id number]   Location [address] [place] PRE OP HOLDING Attending Diana Ibrahim MD   Hosp Day # 0 PCP Vianey Mann MD     Impression and Plan:  Urinary ret Onset   • Obesity Father    • Diabetes Father    • Other (Other) Father         stroke   • Cancer Mother         colon CA and stomach CA       Social History    Socioeconomic History      Marital status:       Spouse name: Not on file      Number of

## 2020-02-05 NOTE — ANESTHESIA PROCEDURE NOTES
Airway  Urgency: elective      General Information and Staff    Patient location during procedure: OR  Anesthesiologist: Luis Dubon MD  Resident/CRNA: Nessa Muller CRNA  Performed: CRNA     Indications and Patient Condition  Indications for airway manag

## 2020-02-05 NOTE — ANESTHESIA POSTPROCEDURE EVALUATION
[address] Patient Status:  Hospital Outpatient Surgery   Age/Gender 75year old male MRN [id number]   [facility] SURGERY Attending Tl Marx MD   [facility] Day # 0 PCP Jada Wilkes MD       Anesthesia Post-op Note    Proc

## 2020-02-05 NOTE — PROGRESS NOTES
PATIENT HAS IV FLUIDS INFUSING, ON ROOM AIR, KENT IN PLACE WITH CBI INFUSING, IV ANCEF, BEDREST TONIGHT, TOLERATED A CLEAR LIQUID DIET-WILL ADVANCE AS TOLERATED, WILL BE UP WITH ASSIST TOMORROW. WILL CONTINUE TO MONITOR.

## 2020-02-05 NOTE — ANESTHESIA PREPROCEDURE EVALUATION
PRE-OP EVALUATION    Patient Name: Won Murphy    Pre-op Diagnosis: Benign prostatic hyperplasia with lower urinary tract symptoms, symptom details unspecified [N40.1]    Procedure(s):  CYSTOSCOPY, TRANSURETHRAL RESECTION OF PROSTATE    Surgeon(s) and Ro 01/15/2020    MCH 29.9 01/14/2020    MCHC 32.3 01/15/2020    MCHC 33.2 01/14/2020    RDW 12.2 01/15/2020    RDW 12.4 01/14/2020    .0 01/15/2020     01/14/2020     Lab Results   Component Value Date     01/27/2020    K 4.90 01/27/2020

## 2020-02-05 NOTE — PLAN OF CARE
Problem: Patient/Family Goals  Goal: Patient/Family Long Term Goal  Description  Patient's Long Term Goal: DISCHARGE HOME    Interventions:  - PAIN TOLERABLE  -TOLERATING DIET  -RETURN TO PREVIOUS ADL'S  - See additional Care Plan goals for specific inte FROM PACU IN STABLE CONDITION AT 1350. FAMILY PRESENT AT BEDSIDE.

## 2020-02-05 NOTE — OPERATIVE REPORT
[address] Patient Status:  Hospital Outpatient Surgery    1945 MRN [id number]   Location [address] [place] POST ANESTHESIA CARE UNIT Attending Ginny Quiroz MD   [facility] Day # 0 PCP Torito Hargrove MD     Date of Operation;  2020 the prostate was then fulgurated. The resected pieces of tissue were irrigated out of the bladder. The bladder was examined. The ureteral orifices appeared to be intact. A 20 Weston catheter was placed.  The irrigation was returning dark red until I applied

## 2020-02-05 NOTE — CONSULTS
General Medicine Consult      Reason for consult: post-op medical mgmgt    Consulted by: Dr. Dino Betancur    PCP: Merilynn Apley, MD    History of Present Illness:   Pt is a 75year old M w/ PMHx HTN, HLD, gout, thyroid disorder, CKD, and BPH and recent hospitalizati Never Smoker      Smokeless tobacco: Never Used      Tobacco comment: CIGARS LONG AGO    Alcohol use: Yes      Frequency: 2-3 times a week      Drinks per session: 1 or 2      Binge frequency: Never      Comment: occ       Fam Hx  Family History   Problem Joint spaces are preserved. There is no periosteal reaction or cortical erosion. There is minimal spurring along the superior pole of the patella. There is a small suprapatellar joint effusion.  Atherosclerotic calcification is present in the femoral, popli to localize potential stones in the cranio-caudal plane. Dose reduction techniques were used. Dose information is transmitted to the [facility] [employer] [facility]iology) NRDR ([address]) which includes the Dose Index Registry.   AIDE Negative. CONCLUSION:  Mild to moderate right and moderate to severe left hydroureteronephrosis. This is likely in part related to bladder wall thickening. This may represent cystitis. A neoplastic process is difficult to exclude.   At the distal l Anil Acosta MD  [facility] IM Hospitalist  Pager: [phone]

## 2020-02-06 LAB
ANION GAP SERPL CALC-SCNC: 4 MMOL/L (ref 0–18)
BUN BLD-MCNC: 23 MG/DL (ref 7–18)
BUN/CREAT SERPL: 13 (ref 10–20)
CALCIUM BLD-MCNC: 9.1 MG/DL (ref 8.5–10.1)
CHLORIDE SERPL-SCNC: 109 MMOL/L (ref 98–112)
CO2 SERPL-SCNC: 26 MMOL/L (ref 21–32)
CREAT BLD-MCNC: 1.77 MG/DL (ref 0.7–1.3)
GLUCOSE BLD-MCNC: 95 MG/DL (ref 70–99)
HGB BLD-MCNC: 12.5 G/DL (ref 13–17.5)
OSMOLALITY SERPL CALC.SUM OF ELEC: 291 MOSM/KG (ref 275–295)
POTASSIUM SERPL-SCNC: 4.5 MMOL/L (ref 3.5–5.1)
SODIUM SERPL-SCNC: 139 MMOL/L (ref 136–145)

## 2020-02-06 PROCEDURE — 80048 BASIC METABOLIC PNL TOTAL CA: CPT | Performed by: UROLOGY

## 2020-02-06 PROCEDURE — 85018 HEMOGLOBIN: CPT | Performed by: UROLOGY

## 2020-02-06 NOTE — PLAN OF CARE
Ist day post TURP, still has cherry colored urine with CBI infusing, no clots noted. Denies any pain or any bladder spasms. Abdomen is soft and non distended, states appetite is improving, no BM since Sunday, bowel sounds are present, passing gas.  Given st

## 2020-02-06 NOTE — PLAN OF CARE
Pt is alert and oriented. Lungs are clear diminished bilaterally. Bowel sounds are active. PT reports passing flatus. Tolerating clear liquids.  in place. RA. Weston draining red output. CBI running at moderate rate. IV saline locked.  Plan of care review new pain  - Anticipate increased pain with activity and pre-medicate as appropriate  Outcome: Progressing

## 2020-02-06 NOTE — PROGRESS NOTES
Katya Maharaj Hospitalist note    PCP: Eve Howell MD    Chief Complaint:  S/p turp with L ureteral stent removal    SUBJECTIVE:  Sig hematuria. Otherwise feels well, no sob/dizziness/nausea. OBJECTIVE:  Temp:  [97.6 °F (36.4 °C)-98 °F (36.7 °C)] 98 °F (36. hydroxide, bisacodyl, Fleet Enema, Oxybutynin Chloride, Belladonna Alkaloids-Opium       Assessment/Plan:     # S/p TURP with  L ureteral stent removal  - post-op mgmt per urology  - hematuria noted, keep overnight per urology- cbi restarted.     # Hx HTN

## 2020-02-06 NOTE — PROGRESS NOTES
[facility]  Urology Progress Note    Araceli Gant Patient Status:  Outpatient in a Bed    1945 MRN [id number]   [facility] 3NW-A Attending Kd Carlson MD   [facility] Day # 0 PCP Efren Lozoya MD     Subjective:  Araceli Gant is a(n) remains clear, clamp CBI again at 6 AM with anticipation of VT tomorrow. Office appt cancelled. Above discussed with nurse and Dr. Jacob Yuan.     Idania Simon P.A.-C  [facility] Urology

## 2020-02-06 NOTE — DIETARY NOTE
[address] [place] [address]     Admitting diagnosis:  Benign prostatic hyperplasia with lower urinary tract symptoms, symptom details unspecified [N40.1]    Ht: 182.9 cm (6')  Wt: 92 kg (202 lb 13.2 oz).  This is 113 % of IBW  Body

## 2020-02-07 VITALS
SYSTOLIC BLOOD PRESSURE: 148 MMHG | HEIGHT: 72 IN | WEIGHT: 202.81 LBS | RESPIRATION RATE: 18 BRPM | DIASTOLIC BLOOD PRESSURE: 81 MMHG | TEMPERATURE: 98 F | BODY MASS INDEX: 27.47 KG/M2 | HEART RATE: 74 BPM | OXYGEN SATURATION: 99 %

## 2020-02-07 LAB
ANION GAP SERPL CALC-SCNC: 6 MMOL/L (ref 0–18)
BUN BLD-MCNC: 29 MG/DL (ref 7–18)
BUN/CREAT SERPL: 14.6 (ref 10–20)
CALCIUM BLD-MCNC: 9 MG/DL (ref 8.5–10.1)
CHLORIDE SERPL-SCNC: 105 MMOL/L (ref 98–112)
CO2 SERPL-SCNC: 23 MMOL/L (ref 21–32)
CREAT BLD-MCNC: 1.98 MG/DL (ref 0.7–1.3)
GLUCOSE BLD-MCNC: 102 MG/DL (ref 70–99)
HGB BLD-MCNC: 12.1 G/DL (ref 13–17.5)
OSMOLALITY SERPL CALC.SUM OF ELEC: 284 MOSM/KG (ref 275–295)
POTASSIUM SERPL-SCNC: 4.2 MMOL/L (ref 3.5–5.1)
SODIUM SERPL-SCNC: 134 MMOL/L (ref 136–145)

## 2020-02-07 PROCEDURE — 85018 HEMOGLOBIN: CPT | Performed by: PHYSICIAN ASSISTANT

## 2020-02-07 PROCEDURE — 80048 BASIC METABOLIC PNL TOTAL CA: CPT | Performed by: PHYSICIAN ASSISTANT

## 2020-02-07 NOTE — PLAN OF CARE
Problem: Patient/Family Goals  Goal: Patient/Family Long Term Goal  Description  Patient's Long Term Goal: DISCHARGE HOME    Interventions:  - PAIN TOLERABLE  -TOLERATING DIET  -RETURN TO PREVIOUS ADL'S  - See additional Care Plan goals for specific inte REGULAR DIET WITHOUT NAUSEA, UP IN ROOM AND WALKS IN HALLS WITH STEADY GAIT, STATES HAS SOME LOWER ABD TENDERNESS BUT REFUSES ANY PAIN RX, KENT DRAINING RED TINGED URINE THIS AM WITH NO CLOTS NOTED, KENT REMOVED AT 1030 AND PT VOIDS AT 1230 150 CC OF RED

## 2020-02-07 NOTE — PROGRESS NOTES
DISCHARGED TO HOME PER WHEELCHAIR AND ACCOMPANIED BY FAMILY, DISCHARGED WITH INSTRUCTIONS, PT VERBALIZES UNDERSTANDING OF ALL INSTRUCTIONS AND MEDICATIONS

## 2020-02-07 NOTE — PLAN OF CARE
Pt is alert and oriented x4. Lungs are clear bilaterally. RA. Bowel sounds are hyper active. Pt reports passing flatus. Pt has been constipated since Sunday. He has passed a small soft bowel movement. CBI at moderate rate. Like pink drainage in bag.  Some s distraction and/or relaxation techniques  - Monitor for opioid side effects  - Notify MD/LIP if interventions unsuccessful or patient reports new pain  - Anticipate increased pain with activity and pre-medicate as appropriate  Outcome: Progressing

## 2020-02-12 NOTE — PROGRESS NOTES
Natalie Younger Hospitalist note    PCP: Torito Hargrove MD    Chief Complaint:  S/p turp with L ureteral stent removal    SUBJECTIVE:  Feeling well, would like to go home. Hematuria better after cbi stopped.      OBJECTIVE:  Vitals reviewed- stable    Intake/Output:

## 2020-02-17 NOTE — DISCHARGE SUMMARY
[facility]  Discharge Summary    Angelito Gandhi Patient Status:  Outpatient in a Bed    1945 MRN [id number]   [facility] 3NW-A Attending No att. providers found   Hosp Day # 0 PCP Ligia De La Fuente MD     Date of Admission: 2020

## 2021-02-16 PROBLEM — Z28.21 REFUSED INFLUENZA VACCINE: Status: ACTIVE | Noted: 2021-02-16

## 2021-02-16 PROBLEM — Z28.21 REFUSED PNEUMOCOCCAL VACCINE: Status: ACTIVE | Noted: 2021-02-16

## 2021-02-16 PROBLEM — R33.9 URINARY RETENTION: Status: RESOLVED | Noted: 2020-01-10 | Resolved: 2021-02-16

## 2021-02-16 PROBLEM — E66.9 OBESITY (BMI 30-39.9): Status: ACTIVE | Noted: 2020-01-27

## 2022-10-29 ENCOUNTER — HOSPITAL ENCOUNTER (EMERGENCY)
Facility: HOSPITAL | Age: 77
Discharge: HOME | End: 2022-10-29
Attending: EMERGENCY MEDICINE
Payer: MEDICARE

## 2022-10-29 VITALS
OXYGEN SATURATION: 97 % | WEIGHT: 225 LBS | HEART RATE: 66 BPM | TEMPERATURE: 99 F | BODY MASS INDEX: 30.48 KG/M2 | RESPIRATION RATE: 16 BRPM | HEIGHT: 72 IN | SYSTOLIC BLOOD PRESSURE: 183 MMHG | DIASTOLIC BLOOD PRESSURE: 98 MMHG

## 2022-10-29 DIAGNOSIS — L03.115 CELLULITIS OF RIGHT LEG: Primary | ICD-10-CM

## 2022-10-29 LAB
ALBUMIN SERPL-MCNC: 4 G/DL (ref 3.4–5)
ALBUMIN/GLOB SERPL: 1 {RATIO} (ref 1–2)
ALP LIVER SERPL-CCNC: 103 U/L
ALT SERPL-CCNC: 37 U/L
ANION GAP SERPL CALC-SCNC: 6 MMOL/L (ref 0–18)
AST SERPL-CCNC: 28 U/L (ref 15–37)
BASOPHILS # BLD AUTO: 0.03 X10(3) UL (ref 0–0.2)
BASOPHILS NFR BLD AUTO: 0.3 %
BILIRUB SERPL-MCNC: 1.8 MG/DL (ref 0.1–2)
BUN BLD-MCNC: 21 MG/DL (ref 7–18)
CALCIUM BLD-MCNC: 9.4 MG/DL (ref 8.5–10.1)
CHLORIDE SERPL-SCNC: 114 MMOL/L (ref 98–112)
CO2 SERPL-SCNC: 21 MMOL/L (ref 21–32)
CREAT BLD-MCNC: 1.52 MG/DL
CRP SERPL-MCNC: 0.51 MG/DL (ref ?–0.3)
EOSINOPHIL # BLD AUTO: 0.22 X10(3) UL (ref 0–0.7)
EOSINOPHIL NFR BLD AUTO: 2.4 %
ERYTHROCYTE [DISTWIDTH] IN BLOOD BY AUTOMATED COUNT: 12.4 %
GFR SERPLBLD BASED ON 1.73 SQ M-ARVRAT: 47 ML/MIN/1.73M2 (ref 60–?)
GLOBULIN PLAS-MCNC: 3.9 G/DL (ref 2.8–4.4)
GLUCOSE BLD-MCNC: 113 MG/DL (ref 70–99)
HCT VFR BLD AUTO: 47.4 %
HGB BLD-MCNC: 16.3 G/DL
IMM GRANULOCYTES # BLD AUTO: 0.03 X10(3) UL (ref 0–1)
IMM GRANULOCYTES NFR BLD: 0.3 %
LYMPHOCYTES # BLD AUTO: 1.46 X10(3) UL (ref 1–4)
LYMPHOCYTES NFR BLD AUTO: 16 %
MCH RBC QN AUTO: 31.8 PG (ref 26–34)
MCHC RBC AUTO-ENTMCNC: 34.4 G/DL (ref 31–37)
MCV RBC AUTO: 92.6 FL
MONOCYTES # BLD AUTO: 0.61 X10(3) UL (ref 0.1–1)
MONOCYTES NFR BLD AUTO: 6.7 %
NEUTROPHILS # BLD AUTO: 6.8 X10 (3) UL (ref 1.5–7.7)
NEUTROPHILS # BLD AUTO: 6.8 X10(3) UL (ref 1.5–7.7)
NEUTROPHILS NFR BLD AUTO: 74.3 %
OSMOLALITY SERPL CALC.SUM OF ELEC: 296 MOSM/KG (ref 275–295)
PLATELET # BLD AUTO: 193 10(3)UL (ref 150–450)
POTASSIUM SERPL-SCNC: 4.3 MMOL/L (ref 3.5–5.1)
PROT SERPL-MCNC: 7.9 G/DL (ref 6.4–8.2)
RBC # BLD AUTO: 5.12 X10(6)UL
SODIUM SERPL-SCNC: 141 MMOL/L (ref 136–145)
WBC # BLD AUTO: 9.2 X10(3) UL (ref 4–11)

## 2022-10-29 PROCEDURE — 90471 IMMUNIZATION ADMIN: CPT

## 2022-10-29 PROCEDURE — 99284 EMERGENCY DEPT VISIT MOD MDM: CPT

## 2022-10-29 PROCEDURE — 86140 C-REACTIVE PROTEIN: CPT | Performed by: EMERGENCY MEDICINE

## 2022-10-29 PROCEDURE — 80053 COMPREHEN METABOLIC PANEL: CPT | Performed by: EMERGENCY MEDICINE

## 2022-10-29 PROCEDURE — 85025 COMPLETE CBC W/AUTO DIFF WBC: CPT | Performed by: EMERGENCY MEDICINE

## 2022-10-29 PROCEDURE — S0077 INJECTION, CLINDAMYCIN PHOSP: HCPCS | Performed by: EMERGENCY MEDICINE

## 2022-10-29 PROCEDURE — 96365 THER/PROPH/DIAG IV INF INIT: CPT

## 2022-10-29 RX ORDER — CLINDAMYCIN PHOSPHATE 600 MG/50ML
600 INJECTION INTRAVENOUS ONCE
Status: COMPLETED | OUTPATIENT
Start: 2022-10-29 | End: 2022-10-29

## 2022-10-29 RX ORDER — CLINDAMYCIN HYDROCHLORIDE 300 MG/1
300 CAPSULE ORAL 4 TIMES DAILY
Qty: 28 CAPSULE | Refills: 0 | Status: SHIPPED | OUTPATIENT
Start: 2022-10-29 | End: 2022-11-05

## 2022-10-29 NOTE — DISCHARGE INSTRUCTIONS
Follow-up with your primary care doctor within the next 3 to 5 days for wound check. Take the antibiotic as prescribed. Return for spreading redness, fevers, lethargy, or any other concerning symptoms.

## 2022-10-29 NOTE — ED INITIAL ASSESSMENT (HPI)
PT reports bumping R shin on metal ladder 2 weeks ago, and is concerned for infection. Denies fever.

## 2025-01-26 ENCOUNTER — HOSPITAL ENCOUNTER (INPATIENT)
Facility: HOSPITAL | Age: 80
LOS: 2 days | Discharge: HOME HEALTH | End: 2025-01-28
Attending: EMERGENCY MEDICINE | Admitting: INTERNAL MEDICINE
Payer: MEDICARE

## 2025-01-26 ENCOUNTER — APPOINTMENT (OUTPATIENT)
Dept: GENERAL RADIOLOGY | Facility: HOSPITAL | Age: 80
End: 2025-01-26
Attending: EMERGENCY MEDICINE
Payer: MEDICARE

## 2025-01-26 DIAGNOSIS — I10 HYPERTENSION, UNSPECIFIED TYPE: ICD-10-CM

## 2025-01-26 DIAGNOSIS — I48.92 ATRIAL FLUTTER WITH RAPID VENTRICULAR RESPONSE (HCC): Primary | ICD-10-CM

## 2025-01-26 DIAGNOSIS — R07.9 ACUTE CHEST PAIN: ICD-10-CM

## 2025-01-26 LAB
ALBUMIN SERPL-MCNC: 4.7 G/DL (ref 3.2–4.8)
ALBUMIN/GLOB SERPL: 1.7 {RATIO} (ref 1–2)
ALP LIVER SERPL-CCNC: 100 U/L
ALT SERPL-CCNC: 23 U/L
ANION GAP SERPL CALC-SCNC: 14 MMOL/L (ref 0–18)
APTT PPP: 26.5 SECONDS (ref 23–36)
AST SERPL-CCNC: 31 U/L (ref ?–34)
BASOPHILS # BLD AUTO: 0.03 X10(3) UL (ref 0–0.2)
BASOPHILS NFR BLD AUTO: 0.3 %
BILIRUB SERPL-MCNC: 1.1 MG/DL (ref 0.2–1.1)
BUN BLD-MCNC: 28 MG/DL (ref 9–23)
CALCIUM BLD-MCNC: 9.6 MG/DL (ref 8.7–10.6)
CHLORIDE SERPL-SCNC: 110 MMOL/L (ref 98–112)
CHOLEST SERPL-MCNC: 254 MG/DL (ref ?–200)
CO2 SERPL-SCNC: 19 MMOL/L (ref 21–32)
CREAT BLD-MCNC: 1.51 MG/DL
EGFRCR SERPLBLD CKD-EPI 2021: 46 ML/MIN/1.73M2 (ref 60–?)
EOSINOPHIL # BLD AUTO: 0.26 X10(3) UL (ref 0–0.7)
EOSINOPHIL NFR BLD AUTO: 2.6 %
ERYTHROCYTE [DISTWIDTH] IN BLOOD BY AUTOMATED COUNT: 12.4 %
GLOBULIN PLAS-MCNC: 2.8 G/DL (ref 2–3.5)
GLUCOSE BLD-MCNC: 128 MG/DL (ref 70–99)
HCT VFR BLD AUTO: 45.3 %
HDLC SERPL-MCNC: 50 MG/DL (ref 40–59)
HGB BLD-MCNC: 16 G/DL
IMM GRANULOCYTES # BLD AUTO: 0.03 X10(3) UL (ref 0–1)
IMM GRANULOCYTES NFR BLD: 0.3 %
LDLC SERPL CALC-MCNC: 188 MG/DL (ref ?–100)
LYMPHOCYTES # BLD AUTO: 1.78 X10(3) UL (ref 1–4)
LYMPHOCYTES NFR BLD AUTO: 17.8 %
MCH RBC QN AUTO: 32 PG (ref 26–34)
MCHC RBC AUTO-ENTMCNC: 35.3 G/DL (ref 31–37)
MCV RBC AUTO: 90.6 FL
MONOCYTES # BLD AUTO: 0.98 X10(3) UL (ref 0.1–1)
MONOCYTES NFR BLD AUTO: 9.8 %
NEUTROPHILS # BLD AUTO: 6.93 X10 (3) UL (ref 1.5–7.7)
NEUTROPHILS # BLD AUTO: 6.93 X10(3) UL (ref 1.5–7.7)
NEUTROPHILS NFR BLD AUTO: 69.2 %
NONHDLC SERPL-MCNC: 204 MG/DL (ref ?–130)
OSMOLALITY SERPL CALC.SUM OF ELEC: 303 MOSM/KG (ref 275–295)
PLATELET # BLD AUTO: 206 10(3)UL (ref 150–450)
POTASSIUM SERPL-SCNC: 4.9 MMOL/L (ref 3.5–5.1)
PROT SERPL-MCNC: 7.5 G/DL (ref 5.7–8.2)
RBC # BLD AUTO: 5 X10(6)UL
SODIUM SERPL-SCNC: 143 MMOL/L (ref 136–145)
TRIGL SERPL-MCNC: 91 MG/DL (ref 30–149)
TROPONIN I SERPL HS-MCNC: 14 NG/L
TROPONIN I SERPL HS-MCNC: 60 NG/L
VLDLC SERPL CALC-MCNC: 19 MG/DL (ref 0–30)
WBC # BLD AUTO: 10 X10(3) UL (ref 4–11)

## 2025-01-26 PROCEDURE — 84484 ASSAY OF TROPONIN QUANT: CPT | Performed by: EMERGENCY MEDICINE

## 2025-01-26 PROCEDURE — 80053 COMPREHEN METABOLIC PANEL: CPT | Performed by: EMERGENCY MEDICINE

## 2025-01-26 PROCEDURE — 96375 TX/PRO/DX INJ NEW DRUG ADDON: CPT

## 2025-01-26 PROCEDURE — 85730 THROMBOPLASTIN TIME PARTIAL: CPT | Performed by: EMERGENCY MEDICINE

## 2025-01-26 PROCEDURE — 93005 ELECTROCARDIOGRAM TRACING: CPT

## 2025-01-26 PROCEDURE — 71045 X-RAY EXAM CHEST 1 VIEW: CPT | Performed by: EMERGENCY MEDICINE

## 2025-01-26 PROCEDURE — 80061 LIPID PANEL: CPT | Performed by: EMERGENCY MEDICINE

## 2025-01-26 PROCEDURE — 93010 ELECTROCARDIOGRAM REPORT: CPT

## 2025-01-26 PROCEDURE — 96365 THER/PROPH/DIAG IV INF INIT: CPT

## 2025-01-26 PROCEDURE — 85025 COMPLETE CBC W/AUTO DIFF WBC: CPT | Performed by: EMERGENCY MEDICINE

## 2025-01-26 PROCEDURE — 99291 CRITICAL CARE FIRST HOUR: CPT

## 2025-01-26 RX ORDER — ADENOSINE 3 MG/ML
6 INJECTION, SOLUTION INTRAVENOUS ONCE
Status: COMPLETED | OUTPATIENT
Start: 2025-01-26 | End: 2025-01-26

## 2025-01-26 RX ORDER — HEPARIN SODIUM AND DEXTROSE 10000; 5 [USP'U]/100ML; G/100ML
INJECTION INTRAVENOUS CONTINUOUS
Status: DISCONTINUED | OUTPATIENT
Start: 2025-01-27 | End: 2025-01-28

## 2025-01-26 RX ORDER — SODIUM PHOSPHATE, DIBASIC AND SODIUM PHOSPHATE, MONOBASIC 7; 19 G/230ML; G/230ML
1 ENEMA RECTAL ONCE AS NEEDED
Status: DISCONTINUED | OUTPATIENT
Start: 2025-01-26 | End: 2025-01-28

## 2025-01-26 RX ORDER — ONDANSETRON 2 MG/ML
4 INJECTION INTRAMUSCULAR; INTRAVENOUS EVERY 6 HOURS PRN
Status: DISCONTINUED | OUTPATIENT
Start: 2025-01-26 | End: 2025-01-28

## 2025-01-26 RX ORDER — METOPROLOL TARTRATE 1 MG/ML
5 INJECTION, SOLUTION INTRAVENOUS ONCE
Status: COMPLETED | OUTPATIENT
Start: 2025-01-26 | End: 2025-01-26

## 2025-01-26 RX ORDER — SENNOSIDES 8.6 MG
17.2 TABLET ORAL NIGHTLY PRN
Status: DISCONTINUED | OUTPATIENT
Start: 2025-01-26 | End: 2025-01-28

## 2025-01-26 RX ORDER — BISACODYL 10 MG
10 SUPPOSITORY, RECTAL RECTAL
Status: DISCONTINUED | OUTPATIENT
Start: 2025-01-26 | End: 2025-01-28

## 2025-01-26 RX ORDER — HEPARIN SODIUM AND DEXTROSE 10000; 5 [USP'U]/100ML; G/100ML
1000 INJECTION INTRAVENOUS ONCE
Status: COMPLETED | OUTPATIENT
Start: 2025-01-26 | End: 2025-01-27

## 2025-01-26 RX ORDER — METOCLOPRAMIDE HYDROCHLORIDE 5 MG/ML
5 INJECTION INTRAMUSCULAR; INTRAVENOUS EVERY 8 HOURS PRN
Status: DISCONTINUED | OUTPATIENT
Start: 2025-01-26 | End: 2025-01-28

## 2025-01-26 RX ORDER — AMLODIPINE BESYLATE 5 MG/1
5 TABLET ORAL DAILY
COMMUNITY
End: 2025-01-28

## 2025-01-26 RX ORDER — ADENOSINE 3 MG/ML
INJECTION, SOLUTION INTRAVENOUS
Status: COMPLETED
Start: 2025-01-26 | End: 2025-01-26

## 2025-01-26 RX ORDER — ACETAMINOPHEN 500 MG
500 TABLET ORAL EVERY 4 HOURS PRN
Status: DISCONTINUED | OUTPATIENT
Start: 2025-01-26 | End: 2025-01-28

## 2025-01-26 RX ORDER — POLYETHYLENE GLYCOL 3350 17 G/17G
17 POWDER, FOR SOLUTION ORAL DAILY PRN
Status: DISCONTINUED | OUTPATIENT
Start: 2025-01-26 | End: 2025-01-28

## 2025-01-26 RX ORDER — HEPARIN SODIUM 1000 [USP'U]/ML
5000 INJECTION, SOLUTION INTRAVENOUS; SUBCUTANEOUS ONCE
Status: COMPLETED | OUTPATIENT
Start: 2025-01-26 | End: 2025-01-26

## 2025-01-27 ENCOUNTER — APPOINTMENT (OUTPATIENT)
Dept: CV DIAGNOSTICS | Facility: HOSPITAL | Age: 80
End: 2025-01-27
Attending: HOSPITALIST
Payer: MEDICARE

## 2025-01-27 ENCOUNTER — APPOINTMENT (OUTPATIENT)
Dept: CV DIAGNOSTICS | Facility: HOSPITAL | Age: 80
End: 2025-01-27
Attending: INTERNAL MEDICINE
Payer: MEDICARE

## 2025-01-27 LAB
ANION GAP SERPL CALC-SCNC: 11 MMOL/L (ref 0–18)
ANION GAP SERPL CALC-SCNC: 12 MMOL/L (ref 0–18)
APTT PPP: 47.9 SECONDS (ref 23–36)
APTT PPP: 48 SECONDS (ref 23–36)
APTT PPP: 59.1 SECONDS (ref 23–36)
ATRIAL RATE: 138 BPM
ATRIAL RATE: 65 BPM
BASOPHILS # BLD AUTO: 0.04 X10(3) UL (ref 0–0.2)
BASOPHILS NFR BLD AUTO: 0.4 %
BUN BLD-MCNC: 32 MG/DL (ref 9–23)
BUN BLD-MCNC: 32 MG/DL (ref 9–23)
CALCIUM BLD-MCNC: 9 MG/DL (ref 8.7–10.6)
CALCIUM BLD-MCNC: 9.7 MG/DL (ref 8.7–10.6)
CHLORIDE SERPL-SCNC: 111 MMOL/L (ref 98–112)
CHLORIDE SERPL-SCNC: 112 MMOL/L (ref 98–112)
CO2 SERPL-SCNC: 23 MMOL/L (ref 21–32)
CO2 SERPL-SCNC: 23 MMOL/L (ref 21–32)
CREAT BLD-MCNC: 1.51 MG/DL
CREAT BLD-MCNC: 1.55 MG/DL
EGFRCR SERPLBLD CKD-EPI 2021: 45 ML/MIN/1.73M2 (ref 60–?)
EGFRCR SERPLBLD CKD-EPI 2021: 46 ML/MIN/1.73M2 (ref 60–?)
EOSINOPHIL # BLD AUTO: 0.14 X10(3) UL (ref 0–0.7)
EOSINOPHIL NFR BLD AUTO: 1.5 %
ERYTHROCYTE [DISTWIDTH] IN BLOOD BY AUTOMATED COUNT: 12.5 %
GLUCOSE BLD-MCNC: 100 MG/DL (ref 70–99)
GLUCOSE BLD-MCNC: 117 MG/DL (ref 70–99)
HCT VFR BLD AUTO: 44.4 %
HGB BLD-MCNC: 15.5 G/DL
IMM GRANULOCYTES # BLD AUTO: 0.03 X10(3) UL (ref 0–1)
IMM GRANULOCYTES NFR BLD: 0.3 %
LYMPHOCYTES # BLD AUTO: 1.44 X10(3) UL (ref 1–4)
LYMPHOCYTES NFR BLD AUTO: 15.6 %
MCH RBC QN AUTO: 32 PG (ref 26–34)
MCHC RBC AUTO-ENTMCNC: 34.9 G/DL (ref 31–37)
MCV RBC AUTO: 91.7 FL
MONOCYTES # BLD AUTO: 0.77 X10(3) UL (ref 0.1–1)
MONOCYTES NFR BLD AUTO: 8.3 %
NEUTROPHILS # BLD AUTO: 6.84 X10 (3) UL (ref 1.5–7.7)
NEUTROPHILS # BLD AUTO: 6.84 X10(3) UL (ref 1.5–7.7)
NEUTROPHILS NFR BLD AUTO: 73.9 %
OSMOLALITY SERPL CALC.SUM OF ELEC: 309 MOSM/KG (ref 275–295)
OSMOLALITY SERPL CALC.SUM OF ELEC: 310 MOSM/KG (ref 275–295)
P AXIS: 47 DEGREES
P-R INTERVAL: 172 MS
P-R INTERVAL: 94 MS
PLATELET # BLD AUTO: 196 10(3)UL (ref 150–450)
POTASSIUM SERPL-SCNC: 4.4 MMOL/L (ref 3.5–5.1)
POTASSIUM SERPL-SCNC: 4.4 MMOL/L (ref 3.5–5.1)
Q-T INTERVAL: 306 MS
Q-T INTERVAL: 406 MS
QRS DURATION: 78 MS
QRS DURATION: 88 MS
QTC CALCULATION (BEZET): 422 MS
QTC CALCULATION (BEZET): 463 MS
R AXIS: 6 DEGREES
R AXIS: 9 DEGREES
RBC # BLD AUTO: 4.84 X10(6)UL
SODIUM SERPL-SCNC: 146 MMOL/L (ref 136–145)
SODIUM SERPL-SCNC: 146 MMOL/L (ref 136–145)
T AXIS: 150 DEGREES
T AXIS: 44 DEGREES
TROPONIN I SERPL HS-MCNC: 75 NG/L
VENTRICULAR RATE: 138 BPM
VENTRICULAR RATE: 65 BPM
WBC # BLD AUTO: 9.3 X10(3) UL (ref 4–11)

## 2025-01-27 PROCEDURE — 94760 N-INVAS EAR/PLS OXIMETRY 1: CPT

## 2025-01-27 PROCEDURE — 93018 CV STRESS TEST I&R ONLY: CPT | Performed by: INTERNAL MEDICINE

## 2025-01-27 PROCEDURE — 93005 ELECTROCARDIOGRAM TRACING: CPT

## 2025-01-27 PROCEDURE — 93306 TTE W/DOPPLER COMPLETE: CPT | Performed by: HOSPITALIST

## 2025-01-27 PROCEDURE — 85730 THROMBOPLASTIN TIME PARTIAL: CPT | Performed by: INTERNAL MEDICINE

## 2025-01-27 PROCEDURE — 84484 ASSAY OF TROPONIN QUANT: CPT | Performed by: INTERNAL MEDICINE

## 2025-01-27 PROCEDURE — 93010 ELECTROCARDIOGRAM REPORT: CPT | Performed by: INTERNAL MEDICINE

## 2025-01-27 PROCEDURE — 85025 COMPLETE CBC W/AUTO DIFF WBC: CPT | Performed by: INTERNAL MEDICINE

## 2025-01-27 PROCEDURE — 93017 CV STRESS TEST TRACING ONLY: CPT | Performed by: INTERNAL MEDICINE

## 2025-01-27 PROCEDURE — 78452 HT MUSCLE IMAGE SPECT MULT: CPT | Performed by: INTERNAL MEDICINE

## 2025-01-27 PROCEDURE — 80048 BASIC METABOLIC PNL TOTAL CA: CPT | Performed by: HOSPITALIST

## 2025-01-27 PROCEDURE — 80048 BASIC METABOLIC PNL TOTAL CA: CPT | Performed by: INTERNAL MEDICINE

## 2025-01-27 NOTE — PROGRESS NOTES
Pt completed NUC Exercise stress test, pt was able to walk for 4:52 minutes on a kasia stage 1 achieving 86% APMHR. Pt c/o \"SOB\" at peak which resolved quickly in recovery, pt also stated that they had \"left sided neck pain that feels like a pinch nerve\" in early recovery which resolved later in recovery. NUC images pending.

## 2025-01-27 NOTE — CONSULTS
[facility] Cardiology Consultation    Yassine Guajardo Patient Status:  Inpatient    1945 MRN [id number]   [facility] 2NE-A Attending Malinda Cervantes MD   Hosp Day # 1 PCP Mingo Dorantes MD     Reason for Consultation:  atrial flutter      History of Present Illness:  Yassine Guajardo is a a(n) 79 year old male. He has Hypertension , CKD (1.5).  he reports chest pain (burning, tightness in sternal area) yesterday.  He had similar sxs while walking dog one time in last 2 weeks.  Also noticed Hypertension . He was sitting watching TV at the time.  He checked his bp and got  and  at that time. Denies palpitations.  Came to ER.  Regular narrow-complex tachycardia to 140 was albino.  With IV adenosine, atrial flutter was revealed.  He was placed on IV heparin, IV cardizem.  He is now in NSR.    History:  Past Medical History:    BPH (benign prostatic hyperplasia)    Calculus of kidney    s/p ESWL    Chronic renal insufficiency, stage 3 (moderate) (HCC)    Disorder of thyroid    NODULE    Gout    Hyperlipidemia    Hypertension    Obesity    Osteoarthritis     Past Surgical History:   Procedure Laterality Date    Cystoscopy,insert ureteral stent       Family History   Problem Relation Age of Onset    Obesity Father     Diabetes Father     Other (Other) Father         stroke    Cancer Mother         colon CA and stomach CA         Allergies:  Allergies[1]    Medications:      Continuous Infusions:   dilTIAZem Stopped (25 0721)    continuous dose heparin 1,200 Units/hr (25 0350)       Social History:   reports that he has quit smoking. His smoking use included cigars. He has never used smokeless tobacco. He reports current alcohol use. He reports that he does not use drugs.    Review of Systems:  All systems were reviewed and are negative except as described above in HPI.    Physical Exam:      Wt Readings from Last 3 Encounters:   25 224 lb 3.3 oz (101.7 kg)   10/29/22 225 lb (102.1 kg)    05/18/21 234 lb (106.1 kg)       Vitals:    01/27/25 0025 01/27/25 0353 01/27/25 0446 01/27/25 0854   BP: 108/66 110/65  128/76   BP Location: Right arm Left arm  Right arm   Pulse: 83 87 87 75   Resp: 14 16  18   Temp: 97.7 °F (36.5 °C) 97.7 °F (36.5 °C)  97.6 °F (36.4 °C)   TempSrc: Oral Oral  Oral   SpO2: 95% 98% 94% 93%   Weight:       Height:           Temp:  [97.6 °F (36.4 °C)-98.7 °F (37.1 °C)] 97.6 °F (36.4 °C)  Pulse:  [] 75  Resp:  [14-26] 18  BP: ()/() 128/76  SpO2:  [93 %-98 %] 93 %    Temp: 97.6 °F (36.4 °C)  Pulse: 75  Resp: 18  BP: 128/76      General:  Appears comfortable  HEENT: No focal deficits.  Neck: No JVD, carotids 2+ no bruits.  Cardiac: Regular S1S2.  No S3, S4, rub, click.  No murmur.  Lungs: Clear to auscultation and percussion.  Abdomen: Soft, non-tender.   Extremities: No LE edema.  No clubbing or cyanosis  Neurologic: Alert and oriented, normal affect.  Skin: Warm and dry.     Labs:      HEM:  Recent Labs   Lab 01/26/25 1925   WBC 10.0   HGB 16.0   HCT 45.3   .0       Chem:  Recent Labs   Lab 01/26/25  1926 01/27/25  0234    146*   K 4.9 4.4    112   CO2 19.0* 23.0   BUN 28* 32*   CREATSERUM 1.51* 1.51*   ALT 23  --    AST 31  --    ALB 4.7  --        No results for input(s): \"INR\" in the last 168 hours.                  No results found for: \"TROP\", \"CKMB\"      Invalid input(s): \"PBNPML\"                 Telemetry:     Laboratories and Data:  Diagnostics:      EKG, 1/26/25:  atrial flutter, 138/min, NSST changes  EKG, 1/27/2025:  NSR    CXR, 1/27/2025:        Impression   CONCLUSION:  No acute abnormality is seen.                Impression:    1.  Paroxysmal Atrial Flutter.  [id number] = 3  2. Chest pain,. Modest troponin rise.  , r/o significant CAD  3. Hypertension   4. CKD      Recommend:    1.  Stress Cardiolyte Study today  2. Start ac, eliquis after Stress Cardiolyte Study is resulted  3. Switch from amlodipine to cardizem  4. Echo  today          Jose Badillo MD  1/27/2025  9:00 AM         [1] No Known Allergies

## 2025-01-27 NOTE — PROGRESS NOTES
Preliminary stress test shows medium sized moderate severity fixed defect of the basal inferior wall. No reversible defects. Estimated LVEF 69%.

## 2025-01-27 NOTE — PLAN OF CARE
Problem: CARDIOVASCULAR - ADULT  Goal: Ma    NURSING ADMISSION NOTE      Patient admitted via Cart  Oriented to room.  Safety precautions initiated.  Bed in low position.  Call light in reach.  Pt arrived to room 2600 in stable condition. Wife at bedside. Cardizem and heparin gtt infusing. See mar. Vss, afebrile. Ra . Tele aflutter. Cardizem titration per mar. Admission completed. Navigator reviewed with pt and wife. All questions answered. Fall and safety precautions initiated. Sb assist. Continent of BB. Wound to RE noted. See flowsheet. Dressing changed, updated pt on poc for noc. All questions answered. Ptt due at 0230 for heparin titration.     --0447 pt desaturated with sleep to 70's. 2L NC applied.     intains optimal cardiac output and hemodynamic stability  Description: INTERVENTIONS:  - Monitor vital signs, rhythm, and trends  - Monitor for bleeding, hypotension and signs of decreased cardiac output  - Evaluate effectiveness of vasoactive medications to optimize hemodynamic stability  - Monitor arterial and/or venous puncture sites for bleeding and/or hematoma  - Assess quality of pulses, skin color and temperature  - Assess for signs of decreased coronary artery perfusion - ex. Angina  - Evaluate fluid balance, assess for edema, trend weights  Outcome: Progressing  Goal: Absence of cardiac arrhythmias or at baseline  Description: INTERVENTIONS:  - Continuous cardiac monitoring, monitor vital signs, obtain 12 lead EKG if indicated  - Evaluate effectiveness of antiarrhythmic and heart rate control medications as ordered  - Initiate emergency measures for life threatening arrhythmias  - Monitor electrolytes and administer replacement therapy as ordered  Outcome: Progressing     Problem: Patient/Family Goals  Goal: Patient/Family Long Term Goal  Description: Patient's Long Term Goal:     Interventions:  -   - See additional Care Plan goals for specific interventions  Outcome: Progressing  Goal:  Patient/Family Short Term Goal  Description: Patient's Short Term Goal:     Interventions:   -  - See additional Care Plan goals for specific interventions  Outcome: Progressing     Problem: PAIN - ADULT  Goal: Verbalizes/displays adequate comfort level or patient's stated pain goal  Description: INTERVENTIONS:  - Encourage pt to monitor pain and request assistance  - Assess pain using appropriate pain scale  - Administer analgesics based on type and severity of pain and evaluate response  - Implement non-pharmacological measures as appropriate and evaluate response  - Consider cultural and social influences on pain and pain management  - Manage/alleviate anxiety  - Utilize distraction and/or relaxation techniques  - Monitor for opioid side effects  - Notify MD/LIP if interventions unsuccessful or patient reports new pain  - Anticipate increased pain with activity and pre-medicate as appropriate  Outcome: Progressing     Problem: RISK FOR INFECTION - ADULT  Goal: Absence of fever/infection during anticipated neutropenic period  Description: INTERVENTIONS  - Monitor WBC  - Administer growth factors as ordered  - Implement neutropenic guidelines  Outcome: Progressing     Problem: SAFETY ADULT - FALL  Goal: Free from fall injury  Description: INTERVENTIONS:  - Assess pt frequently for physical needs  - Identify cognitive and physical deficits and behaviors that affect risk of falls.  - Lead fall precautions as indicated by assessment.  - Educate pt/family on patient safety including physical limitations  - Instruct pt to call for assistance with activity based on assessment  - Modify environment to reduce risk of injury  - Provide assistive devices as appropriate  - Consider OT/PT consult to assist with strengthening/mobility  - Encourage toileting schedule  Outcome: Progressing

## 2025-01-27 NOTE — ED QUICK NOTES
Orders for admission, patient is aware of plan and ready to go upstairs. Any questions, please call ED RN gilberto at extension [phone]    Patient Covid vaccination status: Unvaccinated     COVID Test Ordered in ED: None    COVID Suspicion at Admission: N/A    Running Infusions:    dilTIAZem 10 mg/hr (01/26/25 2008)    [START ON 1/27/2025] continuous dose heparin          Mental Status/LOC at time of transport: oriented x4    Other pertinent information: na  CIWA score: N/A   NIH score:  N/A

## 2025-01-27 NOTE — H&P
[facility]  HOSPITALIST  History and Physical     Yassine Guajardo Patient Status:  Inpatient    1945 MRN [id number]   [facility] ED 2NE-A Attending Malinda Cervantes MD   Hosp Day # 1 PCP Mingo Dorantes MD     Chief Complaint:   Chest pain, aflutter    History of Present Illness: Yassine Guajardo is a 79 year old male with PMH sig for HTN, DL, CKD III, BPH who presents to the ED with c/o chest pain, palp. Rates pain as 4/10 with racing heart beat. Pt was watching tv not exerting himself.checked BP and HR at home and BP noted 240/157, HR 140s. Called EMS. Symp lasted 10 min. Denies sob, sweating or nausea, vomitng. States he may have forgotten a few doses of his BP meds. No fever chills or recent illness. In ED noted to be in aflutter w/ RVR. Labs shows creatinine 1.51, trop 60, cbc normal.  Started on heparin and cardizem gtt. Cards consulted. Pt admited for further care and management.     Past Medical History:  Past Medical History:    BPH (benign prostatic hyperplasia)    Calculus of kidney    s/p ESWL    Chronic renal insufficiency, stage 3 (moderate) (HCC)    Disorder of thyroid    NODULE    Gout    Hyperlipidemia    Hypertension    Obesity    Osteoarthritis        Past Surgical History:   Past Surgical History:   Procedure Laterality Date    Cystoscopy,insert ureteral stent         Social History:  reports that he has quit smoking. His smoking use included cigars. He has never used smokeless tobacco. He reports current alcohol use. He reports that he does not use drugs.    Family History:   Family History   Problem Relation Age of Onset    Obesity Father     Diabetes Father     Other (Other) Father         stroke    Cancer Mother         colon CA and stomach CA        Allergies: Allergies[1]    Medications:  Medications Ordered Prior to Encounter[2]    Review of Systems:   A comprehensive 14 point review of systems was completed.    Pertinent positives and negatives noted in the HPI.    Physical Exam:   Complaining of leaking fluid.    Urine Dip:  2+ Leukocytes     /65 (BP Location: Left arm)   Pulse 87   Temp 97.7 °F (36.5 °C) (Oral)   Resp 16   Ht 6' (1.829 m)   Wt 224 lb 3.3 oz (101.7 kg)   SpO2 94%   BMI 30.41 kg/m²   General: No acute distress. Alert and oriented x 3.  HEENT: Normocephalic atraumatic. Moist mucous membranes. EOM-I. PERRLA. Anicteric.  Neck: No lymphadenopathy. No JVD. No carotid bruits.  Respiratory: Clear to auscultation bilaterally. No wheezes. No rhonchi.  Cardiovascular: S1, S2. Regular rate and rhythm. No murmurs, rubs or gallops. Equal pulses.   Chest and Back: No tenderness or deformity.  Abdomen: Soft, nontender, nondistended.  Positive bowel sounds. No rebound, guarding or organomegaly.  Neurologic: No focal neurological deficits. CNII-XII grossly intact.  Musculoskeletal: Moves all extremities.  Extremities: No edema or cyanosis.  Integument: No rashes or lesions.   Psychiatric: Appropriate mood and affect.      Diagnostic Data:      Labs:  Recent Labs   Lab 01/26/25 1925   WBC 10.0   HGB 16.0   MCV 90.6   .0       Recent Labs   Lab 01/26/25 1926 01/27/25  0234   * 117*   BUN 28* 32*   CREATSERUM 1.51* 1.51*   CA 9.6 9.0   ALB 4.7  --     146*   K 4.9 4.4    112   CO2 19.0* 23.0   ALKPHO 100  --    AST 31  --    ALT 23  --    BILT 1.1  --    TP 7.5  --        Estimated Creatinine Clearance: 42.8 mL/min (A) (based on SCr of 1.51 mg/dL (H)).    No results for input(s): \"PTP\", \"INR\" in the last 168 hours.    COVID-19 Lab Results    COVID-19  No results found for: \"COVID19\"    Pro-Calcitonin  No results for input(s): \"PCT\" in the last 168 hours.    Cardiac  No results for input(s): \"TROP\", \"PBNP\" in the last 168 hours.    Creatinine Kinase  No results for input(s): \"CK\" in the last 168 hours.    Inflammatory Markers  No results for input(s): \"CRP\", \"JACKSON\", \"LDH\", \"DDIMER\" in the last 168 hours.    Recent Labs   Lab 01/26/25 1926 01/26/25  2140   TROPHS 14 60*       Imaging: Imaging data reviewed in  Epic.      ASSESSMENT / PLAN:   : Yassine Guajardo is a 79 year old male with PMH sig for HTN, DL, CKD III, BPH who presents to the ED with c/o chest pain, palp.    Chest pain, palp - 2/2 new onset afib  -admit tele  -monitor  -supportive measures  -IV heparin, cardizem per cards  -monitor BP closely  -ECHO done  -Cards consulted >> apprec recs  >> eliquis, stress test; cardizem to amlodipine    DL  HTN  -resume home meds  -statin    CKD III  -monitor renal function  -avoid nephrotoxic agents    BPH  -flomax      Quality:  DVT Prophylaxis: heparin gtt   CODE status: full  Weston: no  If COVID testing is negative, may discontinue isolation: yes     Plan of care discussed with patient and all questions answered.        Malinda Cervantes MD  [facility] Hospitalist  Pager [phone]  Answering Service number: [phone]            **Certification      PHYSICIAN Certification of Need for Inpatient Hospitalization - Initial Certification    Patient will require inpatient services that will reasonably be expected to span two midnight's based on the clinical documentation in H+P.   Based on patients current state of illness, I anticipate that, after discharge, patient will require TBD.                  [1] No Known Allergies  [2]   No current facility-administered medications on file prior to encounter.     Current Outpatient Medications on File Prior to Encounter   Medication Sig Dispense Refill    amLODIPine 5 MG Oral Tab Take 1 tablet (5 mg total) by mouth daily.      METOPROLOL SUCCINATE ER 50 MG Oral Tablet 24 Hr TAKE 1 TABLET BY MOUTH EVERY DAY 90 tablet 0

## 2025-01-27 NOTE — ED PROVIDER NOTES
Patient Seen in: [facility] Emergency Department      History     Chief Complaint   Patient presents with    Hypertension    Chest Pain Angina     Stated Complaint:     Subjective:   HPI      79-year-old male with history of hypertension comes to the emergency department stating that he developed 4/10 anterior chest pressure associated with a racing heartbeat around 6 PM while he was sitting watching TV.  He took his blood pressure which was 240/157 and his heart rate was 140.  This prompted a call to the medics.  He states that the discomfort lasted for only 10 minutes and then reoccurred in the ambulance en route to the hospital.  He denies any shortness of breath, diaphoresis or nausea.  He denies headache today although he had a headache earlier this week.  He is on a beta-blocker and states that he may have forgotten a couple of doses a couple of days ago but took a dose today.  No history of an arrhythmia.  No recent fever or other illness.    Objective:     Past Medical History:    BPH (benign prostatic hyperplasia)    Calculus of kidney    s/p ESWL    Chronic renal insufficiency, stage 3 (moderate) (HCC)    Disorder of thyroid    NODULE    Gout    Hyperlipidemia    Hypertension    Obesity    Osteoarthritis              Past Surgical History:   Procedure Laterality Date    Cystoscopy,insert ureteral stent                  Social History     Socioeconomic History    Marital status:     Number of children: 2   Occupational History    Occupation: Retired teacher   Tobacco Use    Smoking status: Some Days     Types: Cigars    Smokeless tobacco: Never   Vaping Use    Vaping status: Never Used   Substance and Sexual Activity    Alcohol use: Yes     Comment: occ    Drug use: No   Other Topics Concern    Caffeine Concern Yes     Comment: daily    Exercise Yes     Comment: occ                  Physical Exam     ED Triage Vitals [01/26/25 1927]   BP (!) 194/102   Pulse (!) 140   Resp 21   Temp 98.7 °F  (37.1 °C)   Temp src Oral   SpO2 98 %   O2 Device None (Room air)       Current Vitals:   Vital Signs  BP: 117/71  Pulse: 71  Resp: 26  Temp: 98.7 °F (37.1 °C)  Temp src: Oral  MAP (mmHg): 87    Oxygen Therapy  SpO2: 96 %  O2 Device: None (Room air)        Physical Exam     General Appearance: This is an older male sitting on a gurney.  Vital signs were reviewed per nurses notes.  Monitor reveals a narrow complex tachycardia rate of 140.  HEENT: Normocephalic/atraumatic.  Anicteric sclera.  Oral mucosa is moist.  Oropharynx is normal.  Neck: No adenopathy or thyromegaly.  Lungs are clear to auscultation.  Heart exam: Normal S1-S2 without extra sounds or murmurs.   rapid rate, regular rhythm.  Chest is nontender.  Abdomen is nontender.  Extremities are atraumatic.  Skin is dry without rashes or lesions.  Neuroexam: Alert and oriented x 4.  Speech is fluent.  Moving all 4 extremities well.  ED Course     Labs Reviewed   COMP METABOLIC PANEL (14) - Abnormal; Notable for the following components:       Result Value    Glucose 128 (*)     CO2 19.0 (*)     BUN 28 (*)     Creatinine 1.51 (*)     Calculated Osmolality 303 (*)     eGFR-Cr 46 (*)     All other components within normal limits   TROPONIN I HIGH SENSITIVITY - Normal   PTT, ACTIVATED - Normal   CBC WITH DIFFERENTIAL WITH PLATELET   TROPONIN I HIGH SENSITIVITY   RAINBOW DRAW LAVENDER   RAINBOW DRAW LIGHT GREEN   RAINBOW DRAW BLUE     EKG    Rate, intervals and axes as noted on EKG Report.  Rate: 138  Rhythm: Sinus tachycardia with short IN interval.  Reading: Marked ST abnormality, possible inferior subendocardial injury.  Abnormal EKG.  Agree with EKG report.                Intravenous access was obtained.  Laboratory studies were drawn.  The patient was given 5 mg of Lopressor without change in heart rate or rhythm.    The patient was given 6 mg of adenosine IV push and flutter waves were noted consistent with atrial flutter and RVR.    Initial troponin was  normal.    Cardizem and heparin protocol were initiated.  Patient was subsequently noted to be in atrial flutter with variable block.  Blood pressure normalized.    XR CHEST AP PORTABLE  (CPT=71045)    Result Date: 1/26/2025  PROCEDURE:  XR CHEST AP PORTABLE  (CPT=71045)  TECHNIQUE:  AP chest radiograph was obtained.  COMPARISON:  None.  INDICATIONS:  chest pain  PATIENT STATED HISTORY: (As transcribed by Technologist)  Patient offered no additional history at this time.     FINDINGS:  Heart size is within normal limits.  Pleural spaces appear clear.  Mediastinal and hilar contours are normal.  No focal consolidation.            CONCLUSION:  No acute abnormality is seen.   LOCATION:  [facility]      Dictated by (CST): Raffi De La Cruz MD on 1/26/2025 at 7:54 PM     Finalized by (CST): Raffi De La Cruz MD on 1/26/2025 at 7:54 PM       I personally reviewed the images myself and went over results with patient.    I viewed the chest x-ray films myself and there is no acute cardiopulmonary abnormality.    [facility] hospitalist and [facility] cardiology on-call were contacted via [employer] for hospitalization.  Test results and treatment plan were discussed with the patient and his wife prior to admission.    A total of 3 5 minutes of critical care time (exclusive of billable procedures) was administered to manage the patient's cardiovascular instability due to his atrial flutter and RVR.  This involved direct patient intervention, complex decision making, and/or extensive discussions with the patient, family, and clinical staff.      MDM      #1.  Atrial flutter with RVR.  Notable after intravenous adenosine.  EKG indicated sinus tachycardia.  Patient was started on Cardizem and heparin.  Cardiology was notified.  2.  Hypertension.  Blood pressure normalized with beta-blockers and calcium channel blockers.  3.  Acute chest pain.  Initial troponin normal.    Admission disposition: 1/26/2025  8:24 PM           Medical Decision  Making      Disposition and Plan     Clinical Impression:  1. Atrial flutter with rapid ventricular response (HCC)    2. Hypertension, unspecified type    3. Acute chest pain         Disposition:  Admit  1/26/2025  8:24 pm    Follow-up:  No follow-up provider specified.        Medications Prescribed:  Current Discharge Medication List              Supplementary Documentation:         Hospital Problems       Present on Admission  Date Reviewed: 11/16/2021            ICD-10-CM Noted POA    * (Principal) Atrial flutter with rapid ventricular response (HCC) I48.92 1/26/2025 Unknown

## 2025-01-27 NOTE — ED INITIAL ASSESSMENT (HPI)
Pain at the centre of the chest  and palpitations started 1800h . No shortness of breath . Blood pressure is very high received  nitrotablet   from medic . Headache last 2 days  . Patient is taking  metoprolol for his blood pressure but last 2 days he did not took his pill. Alert and awake oriented x4

## 2025-01-27 NOTE — PLAN OF CARE
NURSING NOTES:  0800: Pt received AOx4. Room air. Pt on SR converted. EKG confirmed. Heparin drip infusing. Cardizem drip off.   0900: Echo at bedside. Dr. Badillo came and see pt. Denies pain.  1040: Pt went for stress test.   1315: Pt returned from stress test. Denies discomfort. Heparin drip continues running.  1400: Heparin drip increase to 1400 units/hr=14 ml/hr. PTT at 8pm.  1600: Dr. Badillo will update pt with echo and stress test result in am.  1800: Ambulated pt in the hallway-tolerated well. Denies CP or any discomfort.    Problem: CARDIOVASCULAR - ADULT  Goal: Maintains optimal cardiac output and hemodynamic stability  Description: INTERVENTIONS:  - Monitor vital signs, rhythm, and trends  - Monitor for bleeding, hypotension and signs of decreased cardiac output  - Evaluate effectiveness of vasoactive medications to optimize hemodynamic stability  - Monitor arterial and/or venous puncture sites for bleeding and/or hematoma  - Assess quality of pulses, skin color and temperature  - Assess for signs of decreased coronary artery perfusion - ex. Angina  - Evaluate fluid balance, assess for edema, trend weights  Outcome: Progressing  Goal: Absence of cardiac arrhythmias or at baseline  Description: INTERVENTIONS:  - Continuous cardiac monitoring, monitor vital signs, obtain 12 lead EKG if indicated  - Evaluate effectiveness of antiarrhythmic and heart rate control medications as ordered  - Initiate emergency measures for life threatening arrhythmias  - Monitor electrolytes and administer replacement therapy as ordered  Outcome: Progressing

## 2025-01-28 VITALS
HEIGHT: 72 IN | DIASTOLIC BLOOD PRESSURE: 74 MMHG | HEART RATE: 69 BPM | TEMPERATURE: 97 F | BODY MASS INDEX: 30.37 KG/M2 | SYSTOLIC BLOOD PRESSURE: 114 MMHG | WEIGHT: 224.19 LBS | RESPIRATION RATE: 20 BRPM | OXYGEN SATURATION: 90 %

## 2025-01-28 LAB
APTT PPP: 30.8 SECONDS (ref 23–36)
APTT PPP: 93.1 SECONDS (ref 23–36)

## 2025-01-28 PROCEDURE — 85730 THROMBOPLASTIN TIME PARTIAL: CPT | Performed by: INTERNAL MEDICINE

## 2025-01-28 PROCEDURE — 94760 N-INVAS EAR/PLS OXIMETRY 1: CPT

## 2025-01-28 RX ORDER — DILTIAZEM HYDROCHLORIDE 180 MG/1
180 CAPSULE, COATED, EXTENDED RELEASE ORAL DAILY
Qty: 90 CAPSULE | Refills: 3 | Status: SHIPPED | OUTPATIENT
Start: 2025-01-29

## 2025-01-28 RX ORDER — METOPROLOL SUCCINATE 50 MG/1
50 TABLET, EXTENDED RELEASE ORAL
Qty: 90 TABLET | Refills: 3 | Status: SHIPPED | OUTPATIENT
Start: 2025-01-29

## 2025-01-28 RX ORDER — DILTIAZEM HYDROCHLORIDE 180 MG/1
180 CAPSULE, EXTENDED RELEASE ORAL DAILY
Status: DISCONTINUED | OUTPATIENT
Start: 2025-01-28 | End: 2025-01-28

## 2025-01-28 RX ORDER — METOPROLOL SUCCINATE 50 MG/1
50 TABLET, EXTENDED RELEASE ORAL
Status: DISCONTINUED | OUTPATIENT
Start: 2025-01-28 | End: 2025-01-28

## 2025-01-28 NOTE — DISCHARGE SUMMARY
[facility] Hospitalist Discharge Summary     Patient ID:  Yassine Guajardo  79 year old  7/26/1945    Admit date: 1/26/2025    Discharge date and time: 01/28/25     Attending Physician: Ladarius Bustamante*     Primary Care Physician: Mingo Dorantes MD     Discharge Diagnoses: Acute chest pain [R07.9]  Atrial flutter with rapid ventricular response (HCC) [I48.92]  Hypertension, unspecified type [I10]    Please note that only IHP DMG and EMG patients enrolled in the Medicare ACO, BCBS ACO and BCBS HMOs will be handled by the [facility] Care Management team.  For all other patients, please follow usual protocol for discharge care transition.    Discharge Condition: stable    Disposition:  home    Important Follow up:  - PCP within 2 weeks       Follow-up Information       Mingo Dorantes MD Follow up.    Specialty: Internal Medicine  Contact information:  [address]  [address]  [facility] [address]  [phone]               Jose Badillo MD Follow up.    Specialties: Cardiovascular Diseases, CARDIOLOGY  Why: As scheduled 2/19/2025 @ 9:20 AM  Contact information:  [address]  [address]  [facility] [address] [phone]                                 Hospital Course:        79 year old male with PMH sig for HTN, DL, CKD III, BPH who presents to the ED with c/o chest pain, palp. Rates pain as 4/10 with racing heart beat. Pt was watching tv not exerting himself.checked BP and HR at home and BP noted 240/157, HR 140s. Called EMS. Symp lasted 10 min. Denies sob, sweating or nausea, vomitng. States he may have forgotten a few doses of his BP meds. No fever chills or recent illness. In ED noted to be in aflutter w/ RVR. Labs shows creatinine 1.51, trop 60, cbc normal.  Started on heparin and cardizem gtt. Cards consulted. Pt admited for further care and management.      Chest pain, palp - 2/2 new onset afib  -admit tele  -monitor  -supportive  measures  -IV heparin, cardizem per cards  -monitor BP closely  -ECHO done  -Cards consulted - stress with no ischemia detected  - dc with eliquis 2.5 BID given age, Cr  - cont cardizem on dc  - OP cardiology o/p      DL  HTN  -resume home meds  -statin     CKD III  -monitor renal function  -avoid nephrotoxic agents     BPH  -flomax    Consults: IP CONSULT TO HOSPITALIST  IP CONSULT TO CARDIOLOGY    Operative Procedures:        Patient instructions:      I as the attending physician reconciled the current and discharge medications on day of discharge.     Current Discharge Medication List        START taking these medications    Details   apixaban 2.5 MG Oral Tab Take 1 tablet (2.5 mg total) by mouth 2 (two) times daily.      dilTIAZem  MG Oral Capsule SR 24 Hr Take 1 capsule (180 mg total) by mouth daily.           CONTINUE these medications which have CHANGED    Details   metoprolol succinate ER 50 MG Oral Tablet 24 Hr Take 1 tablet (50 mg total) by mouth Daily Beta Blocker.           STOP taking these medications       amLODIPine 5 MG Oral Tab              Activity: activity as tolerated  Diet: regular diet  Wound Care: as directed  Code Status: Full Code      Discharge Exam:     General: no acute distress, alert and oriented x 3  Heart: RRR  Lungs: clear bilaterally, no active wheezing  Abdomen: nontender, nondistended, intact BS  Extremities: no pedal edema   Neuro: CN inact, no focal deficits      Total time coordinating care for discharge: Greater than 30 minutes    Baudilio Bustamante MD  [facility] Hospitalist

## 2025-01-28 NOTE — PLAN OF CARE
Assumed care around 1930. Alert and orientated x 4. Maintaining oxygen saturations on room air. Normal sinus on tele. Denies chest pain. Heparin gtt infusing per orders. Continent of bowel and bladder. Wound to RUE, see flowsheets for details. No complaints of pain. Up with standby assist. Patient updated with plan of care. Call light within reach.    Problem: CARDIOVASCULAR - ADULT  Goal: Maintains optimal cardiac output and hemodynamic stability  Description: INTERVENTIONS:  - Monitor vital signs, rhythm, and trends  - Monitor for bleeding, hypotension and signs of decreased cardiac output  - Evaluate effectiveness of vasoactive medications to optimize hemodynamic stability  - Monitor arterial and/or venous puncture sites for bleeding and/or hematoma  - Assess quality of pulses, skin color and temperature  - Assess for signs of decreased coronary artery perfusion - ex. Angina  - Evaluate fluid balance, assess for edema, trend weights  Outcome: Progressing  Goal: Absence of cardiac arrhythmias or at baseline  Description: INTERVENTIONS:  - Continuous cardiac monitoring, monitor vital signs, obtain 12 lead EKG if indicated  - Evaluate effectiveness of antiarrhythmic and heart rate control medications as ordered  - Initiate emergency measures for life threatening arrhythmias  - Monitor electrolytes and administer replacement therapy as ordered  Outcome: Progressing  Problem: PAIN - ADULT  Goal: Verbalizes/displays adequate comfort level or patient's stated pain goal  Description: INTERVENTIONS:  - Encourage pt to monitor pain and request assistance  - Assess pain using appropriate pain scale  - Administer analgesics based on type and severity of pain and evaluate response  - Implement non-pharmacological measures as appropriate and evaluate response  - Consider cultural and social influences on pain and pain management  - Manage/alleviate anxiety  - Utilize distraction and/or relaxation techniques  - Monitor for  opioid side effects  - Notify MD/LIP if interventions unsuccessful or patient reports new pain  - Anticipate increased pain with activity and pre-medicate as appropriate  Outcome: Progressing   Problem: RISK FOR INFECTION - ADULT  Goal: Absence of fever/infection during anticipated neutropenic period  Description: INTERVENTIONS  - Monitor WBC  - Administer growth factors as ordered  - Implement neutropenic guidelines  Outcome: Progressing  Problem: SAFETY ADULT - FALL  Goal: Free from fall injury  Description: INTERVENTIONS:  - Assess pt frequently for physical needs  - Identify cognitive and physical deficits and behaviors that affect risk of falls.  - [place] fall precautions as indicated by assessment.  - Educate pt/family on patient safety including physical limitations  - Instruct pt to call for assistance with activity based on assessment  - Modify environment to reduce risk of injury  - Provide assistive devices as appropriate  - Consider OT/PT consult to assist with strengthening/mobility  - Encourage toileting schedule  Outcome: Progressing

## 2025-01-28 NOTE — PROGRESS NOTES
01/28/25 0046 01/28/25 0047 01/28/25 0049   Oxygen Therapy   SpO2 (!) 84 % (!) 69 % (!) 82 %   O2 Device None (Room air) None (Room air) None (Room air)      01/28/25 0050 01/28/25 0051 01/28/25 0053   Oxygen Therapy   SpO2 (!) 81 % (!) 83 % (!) 74 %   O2 Device None (Room air) None (Room air) None (Room air)     Desaturated on room air. 2L nasal cannula applied.

## 2025-01-28 NOTE — PROGRESS NOTES
[facility] Card[facility] Progress Note        Yassine Guajardo Patient Status:  Inpatient    1945 MRN [id number]   [facility] 2NE-A Attending Ladarius Bustamante*   Hosp Day # 2 PCP Mingo Dorantes MD     Subjective:  The patient denies  chest pain and shortness of breath.    Medications:      Continuous Infusions:   dilTIAZem Stopped (25 0721)    continuous dose heparin 1,200 Units/hr (25 0729)         Allergies:  Allergies[1]      Objective:        Intake/Output:      Intake/Output Summary (Last 24 hours) at 2025 0935  Last data filed at 2025 1900  Gross per 24 hour   Intake 240 ml   Output --   Net 240 ml     Wt Readings from Last 3 Encounters:   25 224 lb 3.3 oz (101.7 kg)   10/29/22 225 lb (102.1 kg)   21 234 lb (106.1 kg)       Physical Exam:        Vitals:    25 0053 25 0100 25 0354 25 0822   BP:   148/75 144/77   BP Location:   Left arm Left arm   Pulse: 83 66 64 77   Resp:   18 20   Temp:   97.3 °F (36.3 °C) 97.4 °F (36.3 °C)   TempSrc:   Oral Oral   SpO2: (!) 74% 97% 98% 97%   Weight:       Height:           Temp:  [97.3 °F (36.3 °C)-98 °F (36.7 °C)] 97.4 °F (36.3 °C)  Pulse:  [64-83] 77  Resp:  [18-20] 20  BP: (111-148)/(63-95) 144/77  SpO2:  [69 %-98 %] 97 %      Temp: 97.4 °F (36.3 °C)  Pulse: 77  Resp: 20  BP: 144/77  General:  Appears comfortable  HEENT: No focal deficits.  Neck: No JVD, carotids 2+ no bruits.  Cardiac: Regular S1S2.  No S3, S4, rub, click.  No murmur.  Lungs: Clear to auscultation and percussion.  Abdomen: Soft, non-tender.   Extremities: No LE edema.  No clubbing or cyanosis.    Neurologic: Alert and oriented, normal affect.  Skin: Warm and dry.           LABS:      HEM:  Recent Labs   Lab 255 25  1015   WBC 10.0 9.3   HGB 16.0 15.5   HCT 45.3 44.4   .0 196.0       Chem:  Recent Labs   Lab 256 25  0234 25  1015    146* 146*   K 4.9 4.4  4.4    112 111   CO2 19.0* 23.0 23.0   BUN 28* 32* 32*   CREATSERUM 1.51* 1.51* 1.55*   CA 9.6 9.0 9.7   * 117* 100*       Recent Labs   Lab 01/26/25 1926   ALT 23   AST 31   ALB 4.7       Recent Labs   Lab 01/26/25 1926 01/27/25  0234 01/27/25  1015 01/27/25 2012 01/28/25  0613   PTT 26.5 47.9* 48.0* 59.1* 93.1*           No results found for: \"TROP\", \"CKMB\"      Invalid input(s): \"PBNPML\"                       Diagnostics:       Lexiscan cardiolyte stress test 1/27/25    Preliminary stress test shows medium sized moderate severity fixed defect of the basal inferior wall. No reversible defects. Estimated LVEF 69%.                  Telemetry: SR     Laboratories and Data:  Diagnostics:        EKG, 1/26/25:  atrial flutter, 138/min, NSST changes  EKG, 1/27/2025:  NSR     CXR, 1/27/2025:        Impression   CONCLUSION:  No acute abnormality is seen.             echo, 1/27/25:    Conclusions:     1. Left ventricle: The cavity size was normal. Wall thickness was normal.      Systolic function was normal. The estimated ejection fraction was 65-70%,      by visual assessment. No diagnostic evidence for regional wall motion      abnormalities. Left ventricular diastolic function parameters were normal      for the patient's age.   2. Left atrium: The left atrial volume was normal.   3. Aortic valve: Transvalvular velocity was minimally increased. The      findings were consistent with very mild stenosis. The peak systolic      velocity was 2.32m/sec. The mean systolic gradient was 12mm Hg. The valve      area (VTI) was 2.52cm^2. The valve area (VTI) index was 1.13cm^2/m^2.   Impressions:  No previous study from [facility] was   available for comparison.         Impression:     1.  Paroxysmal Atrial Flutter.  DDOHK-2-NCSJ = 3  2. Chest pain,. Modest troponin rise.  ,ischemia not detected on Lexiscan cardiolyte stress test   3. Hypertension   4. CKD        Recommend:     1.  Home today  2.  Start ac, eliquis , 2.5 mg bid given age, Cr level  3. Switched from amlodipine to cardizem  4. See me in 3 weeks             Jose Badillo MD  1/28/2025  9:35 AM           [1] No Known Allergies

## 2025-01-28 NOTE — PLAN OF CARE
Assumed care from nocturnal RN. Patient is A&O x4, forgetful and impulsive, but was pleasant and cooperative throughout the shift. Patient is able to ambulate with stand by assist. Telemetry monitor reads NSR and O2 is stable on room air. Education was provided on plan of care, and patient verbalized understanding. Patient safety was maintained. Call light and belongings are in reach.     POC:  - Heparin gtt  - Dressing changes PRN    1545: Patient has been cleared for discharge. Education provided on follow up appointment and new medications, patient verbalized understanding. All questions have been answered. Patient transported off unit via wheelchair.           Problem: CARDIOVASCULAR - ADULT  Goal: Maintains optimal cardiac output and hemodynamic stability  Description: INTERVENTIONS:  - Monitor vital signs, rhythm, and trends  - Monitor for bleeding, hypotension and signs of decreased cardiac output  - Evaluate effectiveness of vasoactive medications to optimize hemodynamic stability  - Monitor arterial and/or venous puncture sites for bleeding and/or hematoma  - Assess quality of pulses, skin color and temperature  - Assess for signs of decreased coronary artery perfusion - ex. Angina  - Evaluate fluid balance, assess for edema, trend weights  Outcome: Adequate for Discharge  Goal: Absence of cardiac arrhythmias or at baseline  Description: INTERVENTIONS:  - Continuous cardiac monitoring, monitor vital signs, obtain 12 lead EKG if indicated  - Evaluate effectiveness of antiarrhythmic and heart rate control medications as ordered  - Initiate emergency measures for life threatening arrhythmias  - Monitor electrolytes and administer replacement therapy as ordered  Outcome: Adequate for Discharge     Problem: Patient/Family Goals  Goal: Patient/Family Long Term Goal  Description: Patient's Long Term Goal:     Interventions:  -   - See additional Care Plan goals for specific interventions  Outcome: Adequate for  Discharge  Goal: Patient/Family Short Term Goal  Description: Patient's Short Term Goal:     Interventions:   -   - See additional Care Plan goals for specific interventions  Outcome: Adequate for Discharge     Problem: PAIN - ADULT  Goal: Verbalizes/displays adequate comfort level or patient's stated pain goal  Description: INTERVENTIONS:  - Encourage pt to monitor pain and request assistance  - Assess pain using appropriate pain scale  - Administer analgesics based on type and severity of pain and evaluate response  - Implement non-pharmacological measures as appropriate and evaluate response  - Consider cultural and social influences on pain and pain management  - Manage/alleviate anxiety  - Utilize distraction and/or relaxation techniques  - Monitor for opioid side effects  - Notify MD/LIP if interventions unsuccessful or patient reports new pain  - Anticipate increased pain with activity and pre-medicate as appropriate  Outcome: Adequate for Discharge     Problem: RISK FOR INFECTION - ADULT  Goal: Absence of fever/infection during anticipated neutropenic period  Description: INTERVENTIONS  - Monitor WBC  - Administer growth factors as ordered  - Implement neutropenic guidelines  Outcome: Adequate for Discharge     Problem: SAFETY ADULT - FALL  Goal: Free from fall injury  Description: INTERVENTIONS:  - Assess pt frequently for physical needs  - Identify cognitive and physical deficits and behaviors that affect risk of falls.  - [place] fall precautions as indicated by assessment.  - Educate pt/family on patient safety including physical limitations  - Instruct pt to call for assistance with activity based on assessment  - Modify environment to reduce risk of injury  - Provide assistive devices as appropriate  - Consider OT/PT consult to assist with strengthening/mobility  - Encourage toileting schedule  Outcome: Adequate for Discharge

## 2025-01-30 NOTE — PROGRESS NOTES
Physician Clarification    Additional information related to the patient's troponin levels    Afib RVR    This note is part of the patient's medical record.

## 2025-01-30 NOTE — PROGRESS NOTES
Physician Clarification    Additional information related to the patient's condition.     Paroxysmal Atrial flutter new onset     This note is part of the patient's medical record.

## 2025-02-05 NOTE — PROGRESS NOTES
Physician Clarification    Additional information related to the patient's condition    Type 2 MI     This note is part of the patient's medical record.

## (undated) DEVICE — URINE DRAINAGE BAG,BAG, NEEDLE SAMPLING, DRAIN TUBE: Brand: DOVER

## (undated) DEVICE — BASIC DOUBLE BASIN 1-LF: Brand: MEDLINE INDUSTRIES, INC.

## (undated) DEVICE — GAMMEX® PI HYBRID SIZE 8, STERILE POWDER-FREE SURGICAL GLOVE, POLYISOPRENE AND NEOPRENE BLEND: Brand: GAMMEX

## (undated) DEVICE — SOL  .9 3000ML

## (undated) DEVICE — Device: Brand: OLYMPUS

## (undated) DEVICE — Device

## (undated) DEVICE — 1.9FR NITINOL TIPLESS BSKT

## (undated) DEVICE — NITINOL WIRE STR 038

## (undated) DEVICE — TIGERTAIL 5F FLXTIP 70CM

## (undated) DEVICE — REM POLYHESIVE ADULT PATIENT RETURN ELECTRODE: Brand: VALLEYLAB

## (undated) DEVICE — KENDALL SCD EXPRESS SLEEVES, KNEE LENGTH, MEDIUM: Brand: KENDALL SCD

## (undated) DEVICE — GAMMEX® PI HYBRID SIZE 7.5, STERILE POWDER-FREE SURGICAL GLOVE, POLYISOPRENE AND NEOPRENE BLEND: Brand: GAMMEX

## (undated) DEVICE — SEAL Y BIOPSY PORT P6R SCOPE

## (undated) DEVICE — 1071 S-DRP URO STLE-GAMA 10/BX,4X/C: Brand: STERI-DRAPE™

## (undated) DEVICE — SYRINGE 30ML LL TIP

## (undated) DEVICE — SOL H2O 1000ML BTL

## (undated) DEVICE — CYSTO CDS-LF: Brand: MEDLINE INDUSTRIES, INC.

## (undated) DEVICE — SOL  .9 1000ML BTL

## (undated) DEVICE — PLASTC TOOMEY SYRNG DISP

## (undated) DEVICE — HYDROGEL COATED URETERAL DILATOR: Brand: NOTTINGHAM ONE-STEP

## (undated) DEVICE — ELLIK BLADDER EVACUTR DISP

## (undated) DEVICE — SOL GLY 1.5 3000ML

## (undated) NOTE — LETTER
Mar Boyd 182 6 13[facility]  [place], [address] [place] [address]    Consent for Operation  Date: __________________                                Time: _______________    1.  I authorize the performance upon Mandi Gates the following operation:  Procedure(s revealed by the pictures or by descriptive texts accompanying them. If the procedure has been videotaped, the surgeon will obtain the original videotape. The hospital will not be responsible for storage or maintenance of this tape.   7. For the purpose of a THAT MY DOCTOR PROVIDED ME WITH THE ABOVE EXPLANATIONS, THAT ALL BLANKS OR STATEMENTS REQUIRING INSERTION OR COMPLETION WERE FILLED IN.     Signature of Patient:   ___________________________    When the patient is a minor or mentally incompetent to give co iii. All of the medicines I take (including prescriptions, herbal supplements, and pills I can buy without a prescription (including street drugs/illegal medications).  Failure to inform my anesthesiologist about these medicines may increase my risk of anes _____________________________________________________________________________  Anesthesiologist Signature     Date   Time  I have discussed the procedure and information above with the patient (or patient’s representative) and answered their questions.  The

## (undated) NOTE — LETTER
Mar Boyd 182 6 13[facility]  [facility], [address] [facility]    Consent for Operation  Date: __________________                                Time: _______________    1.  I authorize the performance upon Ratna Quiles the following operation:  Procedure(s revealed by the pictures or by descriptive texts accompanying them. If the procedure has been videotaped, the surgeon will obtain the original videotape. The hospital will not be responsible for storage or maintenance of this tape.   7. For the purpose of a THAT MY DOCTOR PROVIDED ME WITH THE ABOVE EXPLANATIONS, THAT ALL BLANKS OR STATEMENTS REQUIRING INSERTION OR COMPLETION WERE FILLED IN.     Signature of Patient:   ___________________________    When the patient is a minor or mentally incompetent to give co iii. All of the medicines I take (including prescriptions, herbal supplements, and pills I can buy without a prescription (including street drugs/illegal medications).  Failure to inform my anesthesiologist about these medicines may increase my risk of anes _____________________________________________________________________________  Anesthesiologist Signature     Date   Time  I have discussed the procedure and information above with the patient (or patient’s representative) and answered their questions.  The

## (undated) NOTE — LETTER
[facility]  Phone: [phone]  OUTSIDE TESTING RESULT REQUEST      TO:   DR BLAS      Today's Date: 1/24/20    FAX #: [phone]     IMPORTANT: FOR YOUR IMMEDIATE ATTENTION  Please FAX all test results listed below to: 61

## (undated) NOTE — LETTER
Mar Boyd 182 6 13[facility]  [place], [address] [facility]    Consent for Operation  Date: __________________                                Time: _______________    1.  I authorize the performance upon Juan Chen the following operation:  Procedure(s revealed by the pictures or by descriptive texts accompanying them. If the procedure has been videotaped, the surgeon will obtain the original videotape. The hospital will not be responsible for storage or maintenance of this tape.     6. For the purpose of THAT MY DOCTOR PROVIDED ME WITH THE ABOVE EXPLANATIONS, THAT ALL BLANKS OR STATEMENTS REQUIRING INSERTION OR COMPLETION WERE FILLED IN.     Signature of Patient:   ___________________________    When the patient is a minor or mentally incompetent to give co

## (undated) NOTE — LETTER
Mar Boyd 182 6 13[facility]  [place], [address] [place] [address]    Consent for Operation  Date: __________________                                Time: _______________    1.  I authorize the performance upon Norfolk Etta the following operation:  Procedure(s revealed by the pictures or by descriptive texts accompanying them. If the procedure has been videotaped, the surgeon will obtain the original videotape. The hospital will not be responsible for storage or maintenance of this tape.     6. For the purpose of THAT MY DOCTOR PROVIDED ME WITH THE ABOVE EXPLANATIONS, THAT ALL BLANKS OR STATEMENTS REQUIRING INSERTION OR COMPLETION WERE FILLED IN.     Signature of Patient:   ___________________________    When the patient is a minor or mentally incompetent to give co

## (undated) NOTE — LETTER
[address]  [address]  Consent for Procedure/Sedation  Date: ***         Time: ***    {[facility] ivs consent:[id number]}